# Patient Record
Sex: FEMALE | Race: WHITE | NOT HISPANIC OR LATINO | Employment: PART TIME | URBAN - NONMETROPOLITAN AREA
[De-identification: names, ages, dates, MRNs, and addresses within clinical notes are randomized per-mention and may not be internally consistent; named-entity substitution may affect disease eponyms.]

---

## 2017-05-22 ENCOUNTER — OFFICE VISIT (OUTPATIENT)
Dept: FAMILY MEDICINE CLINIC | Facility: CLINIC | Age: 37
End: 2017-05-22

## 2017-05-22 VITALS
BODY MASS INDEX: 33.82 KG/M2 | SYSTOLIC BLOOD PRESSURE: 120 MMHG | DIASTOLIC BLOOD PRESSURE: 60 MMHG | OXYGEN SATURATION: 98 % | HEART RATE: 50 BPM | HEIGHT: 66 IN | TEMPERATURE: 97.4 F | WEIGHT: 210.4 LBS

## 2017-05-22 DIAGNOSIS — G62.9 NEUROPATHY: Chronic | ICD-10-CM

## 2017-05-22 DIAGNOSIS — Z00.00 GENERAL MEDICAL EXAM: Primary | ICD-10-CM

## 2017-05-22 PROCEDURE — 99213 OFFICE O/P EST LOW 20 MIN: CPT | Performed by: INTERNAL MEDICINE

## 2017-05-22 RX ORDER — GABAPENTIN 600 MG/1
600 TABLET ORAL 3 TIMES DAILY
Qty: 90 TABLET | Refills: 5 | Status: SHIPPED | OUTPATIENT
Start: 2017-05-22 | End: 2017-11-21 | Stop reason: SDUPTHER

## 2017-11-21 DIAGNOSIS — Z79.899 LONG TERM USE OF DRUG: Primary | ICD-10-CM

## 2017-11-21 RX ORDER — GABAPENTIN 600 MG/1
600 TABLET ORAL 3 TIMES DAILY
Qty: 42 TABLET | Refills: 0 | Status: SHIPPED | OUTPATIENT
Start: 2017-11-21 | End: 2017-11-28 | Stop reason: SDUPTHER

## 2017-11-28 ENCOUNTER — LAB (OUTPATIENT)
Dept: LAB | Facility: OTHER | Age: 37
End: 2017-11-28

## 2017-11-28 DIAGNOSIS — Z00.00 GENERAL MEDICAL EXAM: ICD-10-CM

## 2017-11-28 DIAGNOSIS — Z79.899 LONG TERM USE OF DRUG: ICD-10-CM

## 2017-11-28 LAB
ALBUMIN SERPL-MCNC: 3.8 G/DL (ref 3.2–5.5)
ALBUMIN/GLOB SERPL: 1.2 G/DL (ref 1–3)
ALP SERPL-CCNC: 62 U/L (ref 15–121)
ALT SERPL W P-5'-P-CCNC: 14 U/L (ref 10–60)
ANION GAP SERPL CALCULATED.3IONS-SCNC: 10 MMOL/L (ref 5–15)
AST SERPL-CCNC: 17 U/L (ref 10–60)
BASOPHILS # BLD AUTO: 0.03 10*3/MM3 (ref 0–0.2)
BASOPHILS NFR BLD AUTO: 0.3 % (ref 0–2)
BILIRUB SERPL-MCNC: 0.6 MG/DL (ref 0.2–1)
BUN BLD-MCNC: 11 MG/DL (ref 8–25)
BUN/CREAT SERPL: 13.8 (ref 7–25)
CALCIUM SPEC-SCNC: 9.3 MG/DL (ref 8.4–10.8)
CHLORIDE SERPL-SCNC: 104 MMOL/L (ref 100–112)
CHOLEST SERPL-MCNC: 153 MG/DL (ref 150–200)
CO2 SERPL-SCNC: 28 MMOL/L (ref 20–32)
CREAT BLD-MCNC: 0.8 MG/DL (ref 0.4–1.3)
DEPRECATED RDW RBC AUTO: 39 FL (ref 36.4–46.3)
EOSINOPHIL # BLD AUTO: 0.23 10*3/MM3 (ref 0–0.7)
EOSINOPHIL NFR BLD AUTO: 2.1 % (ref 0–7)
ERYTHROCYTE [DISTWIDTH] IN BLOOD BY AUTOMATED COUNT: 12.9 % (ref 11.5–14.5)
GFR SERPL CREATININE-BSD FRML MDRD: 81 ML/MIN/1.73 (ref 64–149)
GLOBULIN UR ELPH-MCNC: 3.1 GM/DL (ref 2.5–4.6)
GLUCOSE BLD-MCNC: 101 MG/DL (ref 70–100)
HCT VFR BLD AUTO: 42.6 % (ref 35–45)
HDLC SERPL-MCNC: 36 MG/DL (ref 35–100)
HGB BLD-MCNC: 14.1 G/DL (ref 12–15.5)
LDLC SERPL CALC-MCNC: 98 MG/DL
LDLC/HDLC SERPL: 2.73 {RATIO}
LYMPHOCYTES # BLD AUTO: 3.58 10*3/MM3 (ref 0.6–4.2)
LYMPHOCYTES NFR BLD AUTO: 32.3 % (ref 10–50)
MCH RBC QN AUTO: 28 PG (ref 26.5–34)
MCHC RBC AUTO-ENTMCNC: 33.1 G/DL (ref 31.4–36)
MCV RBC AUTO: 84.7 FL (ref 80–98)
MONOCYTES # BLD AUTO: 0.78 10*3/MM3 (ref 0–0.9)
MONOCYTES NFR BLD AUTO: 7 % (ref 0–12)
NEUTROPHILS # BLD AUTO: 6.46 10*3/MM3 (ref 2–8.6)
NEUTROPHILS NFR BLD AUTO: 58.3 % (ref 37–80)
PLATELET # BLD AUTO: 380 10*3/MM3 (ref 150–450)
PMV BLD AUTO: 8.8 FL (ref 8–12)
POTASSIUM BLD-SCNC: 3.9 MMOL/L (ref 3.4–5.4)
PROT SERPL-MCNC: 6.9 G/DL (ref 6.7–8.2)
RBC # BLD AUTO: 5.03 10*6/MM3 (ref 3.77–5.16)
SODIUM BLD-SCNC: 142 MMOL/L (ref 134–146)
T4 SERPL-MCNC: 11.1 MCG/DL (ref 5.5–11)
TRIGL SERPL-MCNC: 94 MG/DL (ref 35–160)
TSH SERPL DL<=0.05 MIU/L-ACNC: 1.88 MIU/ML (ref 0.46–4.68)
VLDLC SERPL-MCNC: 18.8 MG/DL
WBC NRBC COR # BLD: 11.08 10*3/MM3 (ref 3.2–9.8)

## 2017-11-28 PROCEDURE — 85025 COMPLETE CBC W/AUTO DIFF WBC: CPT | Performed by: INTERNAL MEDICINE

## 2017-11-28 PROCEDURE — 80061 LIPID PANEL: CPT | Performed by: INTERNAL MEDICINE

## 2017-11-28 PROCEDURE — 36415 COLL VENOUS BLD VENIPUNCTURE: CPT | Performed by: INTERNAL MEDICINE

## 2017-11-28 PROCEDURE — 80171 DRUG SCREEN QUANT GABAPENTIN: CPT | Performed by: INTERNAL MEDICINE

## 2017-11-28 PROCEDURE — 80053 COMPREHEN METABOLIC PANEL: CPT | Performed by: INTERNAL MEDICINE

## 2017-11-28 PROCEDURE — 84436 ASSAY OF TOTAL THYROXINE: CPT | Performed by: INTERNAL MEDICINE

## 2017-11-28 PROCEDURE — 84443 ASSAY THYROID STIM HORMONE: CPT | Performed by: INTERNAL MEDICINE

## 2017-11-30 ENCOUNTER — OFFICE VISIT (OUTPATIENT)
Dept: FAMILY MEDICINE CLINIC | Facility: CLINIC | Age: 37
End: 2017-11-30

## 2017-11-30 VITALS
SYSTOLIC BLOOD PRESSURE: 112 MMHG | BODY MASS INDEX: 31.23 KG/M2 | HEART RATE: 75 BPM | TEMPERATURE: 97.4 F | DIASTOLIC BLOOD PRESSURE: 74 MMHG | WEIGHT: 194.3 LBS | HEIGHT: 66 IN

## 2017-11-30 DIAGNOSIS — G62.9 NEUROPATHY: Primary | ICD-10-CM

## 2017-11-30 DIAGNOSIS — D22.30 NEVUS OF FACE: ICD-10-CM

## 2017-11-30 LAB — GABAPENTIN SERPLBLD-MCNC: NORMAL UG/ML (ref 4–16)

## 2017-11-30 PROCEDURE — 99213 OFFICE O/P EST LOW 20 MIN: CPT | Performed by: NURSE PRACTITIONER

## 2017-11-30 RX ORDER — GABAPENTIN 600 MG/1
600 TABLET ORAL 3 TIMES DAILY
Qty: 90 TABLET | Refills: 0 | Status: SHIPPED | OUTPATIENT
Start: 2017-11-30 | End: 2017-12-29 | Stop reason: SDUPTHER

## 2017-11-30 NOTE — PROGRESS NOTES
Subjective   Nilda Leny Jerez is a 37 y.o. female. Patient here today with complaints of Med Refill (pt reports out of medication for a week)  Patient here today for recheck of neuropathy, PCP Dr. Farias, needing refills on gabapentin.  She has been out for 1 week.  Reports that this does help her symptoms.  Also complains of an area on her right face which is been present for the last month not itching not bleeding and not changing colors.  Perhaps slightly larger then she noticed initially.    Vitals:    11/30/17 1534   BP: 112/74   Pulse: 75   Temp: 97.4 °F (36.3 °C)     Past Medical History:   Diagnosis Date   • Acute serous otitis media    • Acute sinusitis    • Asthma    • Chronic pain    • Degeneration of lumbar intervertebral disc    • Encounter for gynecological examination    • Encounter for long-term (current) use of medications     other   • Ganglion cyst     Right wrist      • H/O infectious mononucleosis    • Low back pain    • Lumbar radiculopathy     Left   • Menometrorrhagia    • Need for prophylactic vaccination or inoculation against diphtheria and tetanus     Need for prophylactic vaccination and inoculation against Diptheria-tetanus-pertussis, combined [DTP] [DTaP]   • Neuropathy    • Pain in lower limb    • Patient currently pregnant     G 1, P 1    • Routine history and physical examination of adult     Administrative   • Smoker    • Spinal stenosis of lumbar region    • Tenosynovitis     Tendinitis AND/OR tenosynovitis of the pelvic region      • Tibialis tendinitis    • Tobacco dependence syndrome    • Upper respiratory infection      History of Present Illness     The following portions of the patient's history were reviewed and updated as appropriate: allergies, current medications, past family history, past medical history, past social history, past surgical history and problem list.    Review of Systems   Constitutional: Negative.    HENT: Negative.    Skin: Positive for wound  (mole R face).       Objective   Physical Exam   Constitutional: She is oriented to person, place, and time. She appears well-developed and well-nourished. No distress.   HENT:   Head: Normocephalic and atraumatic.   Cardiovascular: Normal rate, regular rhythm and normal heart sounds.  Exam reveals no gallop and no friction rub.    No murmur heard.  Pulmonary/Chest: Effort normal and breath sounds normal. No respiratory distress. She has no wheezes. She has no rales.   Musculoskeletal: Normal range of motion.   Neurological: She is alert and oriented to person, place, and time.   Skin: Skin is warm and dry. Lesion (mole , R face, not itching, not bleeding, ) noted. No rash noted. She is not diaphoretic. No erythema. No pallor.        Psychiatric: She has a normal mood and affect. Her behavior is normal.   Nursing note and vitals reviewed.      Assessment/Plan   Nilda was seen today for med refill.    Diagnoses and all orders for this visit:    Neuropathy    Nevus of face    Other orders  -     gabapentin (NEURONTIN) 600 MG tablet; Take 1 tablet by mouth 3 (Three) Times a Day.    ny is obtained and reviewed, number is [id number]. Will give her refill ×1 month on gabapentin, she needs to follow-up with her PCP as scheduled for chronic conditions.  Advised she monitor mole on right face and if any changes or it starts bleeding or itching changing colors or size then she needs to be referred for removal to dermatology.  She is aware and is in agreement to this.  All questions and concerns are addressed with understanding noted. [place] query complete. Treatment plan to include limited course of prescribed controlled substance. Risks including addiction, benefits, and alternatives presented to patient.

## 2017-12-29 ENCOUNTER — TELEPHONE (OUTPATIENT)
Dept: FAMILY MEDICINE CLINIC | Facility: CLINIC | Age: 37
End: 2017-12-29

## 2017-12-29 RX ORDER — GABAPENTIN 600 MG/1
600 TABLET ORAL 3 TIMES DAILY
Qty: 15 TABLET | Refills: 0 | Status: SHIPPED | OUTPATIENT
Start: 2017-12-29 | End: 2018-01-02 | Stop reason: SDUPTHER

## 2017-12-29 RX ORDER — GABAPENTIN 600 MG/1
600 TABLET ORAL 3 TIMES DAILY
Qty: 15 TABLET | Refills: 0 | Status: CANCELLED | OUTPATIENT
Start: 2017-12-29

## 2017-12-29 NOTE — TELEPHONE ENCOUNTER
----- Message from Ramy White MD sent at 12/29/2017  2:51 PM CST -----   Please obtain an updated Chance.  Patient is requesting gabapentin refill.

## 2017-12-29 NOTE — TELEPHONE ENCOUNTER
Per Dr. White patient may have #15 tab(s) of the Gabapentin, will need to follow up with 's office next Tuesday.

## 2018-01-02 RX ORDER — GABAPENTIN 600 MG/1
600 TABLET ORAL 3 TIMES DAILY
Qty: 21 TABLET | Refills: 0 | Status: SHIPPED | OUTPATIENT
Start: 2018-01-02 | End: 2018-01-08 | Stop reason: SDUPTHER

## 2018-01-08 ENCOUNTER — OFFICE VISIT (OUTPATIENT)
Dept: FAMILY MEDICINE CLINIC | Facility: CLINIC | Age: 38
End: 2018-01-08

## 2018-01-08 VITALS
HEART RATE: 88 BPM | OXYGEN SATURATION: 98 % | SYSTOLIC BLOOD PRESSURE: 128 MMHG | BODY MASS INDEX: 31.88 KG/M2 | DIASTOLIC BLOOD PRESSURE: 72 MMHG | WEIGHT: 198.4 LBS | HEIGHT: 66 IN

## 2018-01-08 DIAGNOSIS — J01.40 ACUTE NON-RECURRENT PANSINUSITIS: Primary | ICD-10-CM

## 2018-01-08 PROCEDURE — 96372 THER/PROPH/DIAG INJ SC/IM: CPT | Performed by: INTERNAL MEDICINE

## 2018-01-08 PROCEDURE — 99213 OFFICE O/P EST LOW 20 MIN: CPT | Performed by: INTERNAL MEDICINE

## 2018-01-08 RX ORDER — TRIAMCINOLONE ACETONIDE 40 MG/ML
40 INJECTION, SUSPENSION INTRA-ARTICULAR; INTRAMUSCULAR ONCE
Status: COMPLETED | OUTPATIENT
Start: 2018-01-08 | End: 2018-01-08

## 2018-01-08 RX ORDER — LEVOFLOXACIN 500 MG/1
500 TABLET, FILM COATED ORAL DAILY
Qty: 10 TABLET | Refills: 0 | Status: SHIPPED | OUTPATIENT
Start: 2018-01-08 | End: 2018-04-23

## 2018-01-08 RX ORDER — GABAPENTIN 600 MG/1
600 TABLET ORAL 3 TIMES DAILY
Qty: 90 TABLET | Refills: 5 | Status: SHIPPED | OUTPATIENT
Start: 2018-01-08 | End: 2018-06-28 | Stop reason: SDUPTHER

## 2018-01-08 RX ADMIN — TRIAMCINOLONE ACETONIDE 40 MG: 40 INJECTION, SUSPENSION INTRA-ARTICULAR; INTRAMUSCULAR at 15:56

## 2018-01-08 NOTE — PROGRESS NOTES
Subjective   Nilda Jerez is a 37 y.o. female.   Chief Complaint   Patient presents with   • URI   • Med Refill     gabapentin     History of Present Illness patient complains of head congestion.  No fever or chills.  No cough.  This been on about 5 days.    The following portions of the patient's history were reviewed and updated as appropriate: allergies, current medications, past family history, past medical history, past social history, past surgical history and problem list.    Review of Systems   Constitutional: Negative for activity change, appetite change, fatigue and unexpected weight change.   HENT: Positive for congestion, sinus pain and sinus pressure. Negative for ear pain, facial swelling and hearing loss.    Eyes: Negative for discharge and visual disturbance.   Respiratory: Negative for cough, chest tightness, shortness of breath and wheezing.    Cardiovascular: Negative for chest pain, palpitations and leg swelling.   Gastrointestinal: Negative for abdominal pain.   Genitourinary: Negative for difficulty urinating, dysuria, flank pain, frequency, hematuria and urgency.   Musculoskeletal: Negative for joint swelling and myalgias.   Skin: Negative for color change and rash.   Allergic/Immunologic: Negative for food allergies.   Neurological: Negative for dizziness, syncope, weakness, numbness and headaches.   Hematological: Negative for adenopathy.   Psychiatric/Behavioral: Negative for confusion, decreased concentration, dysphoric mood, hallucinations, self-injury, sleep disturbance and suicidal ideas. The patient is not nervous/anxious.    All other systems reviewed and are negative.    In past two weeks the patient has not felt down, depressed, hopeless, or lost interest in doing things.   Objective   Physical Exam   Constitutional: She is oriented to person, place, and time. Vital signs are normal. She appears well-developed and well-nourished.   HENT:   Head: Normocephalic.   Nose:  Right sinus exhibits maxillary sinus tenderness and frontal sinus tenderness. Left sinus exhibits maxillary sinus tenderness and frontal sinus tenderness.   Neck: No thyromegaly present.   Cardiovascular: Normal rate, regular rhythm and normal heart sounds.    No murmur heard.  Pulmonary/Chest: Effort normal and breath sounds normal. She has no wheezes. She has no rales.   Musculoskeletal: She exhibits no edema.   Lymphadenopathy:     She has no cervical adenopathy.   Neurological: She is alert and oriented to person, place, and time. No cranial nerve deficit.   Skin: Skin is warm and dry.   Psychiatric: She has a normal mood and affect. Her speech is normal and behavior is normal. Judgment and thought content normal. Her mood appears not anxious. Thought content is not paranoid and not delusional. Cognition and memory are normal. She does not exhibit a depressed mood. She expresses no homicidal and no suicidal ideation. She expresses no suicidal plans and no homicidal plans.   Nursing note and vitals reviewed.      Assessment/Plan   Nilda was seen today for uri and med refill.    Diagnoses and all orders for this visit:    Acute non-recurrent pansinusitis    Other orders  -     gabapentin (NEURONTIN) 600 MG tablet; Take 1 tablet by mouth 3 (Three) Times a Day.    kenlog 40mg im. levaquin 500mg q day for 10 days.

## 2018-05-01 ENCOUNTER — OFFICE VISIT (OUTPATIENT)
Dept: FAMILY MEDICINE CLINIC | Facility: CLINIC | Age: 38
End: 2018-05-01

## 2018-05-01 VITALS
DIASTOLIC BLOOD PRESSURE: 78 MMHG | BODY MASS INDEX: 30.86 KG/M2 | HEIGHT: 66 IN | OXYGEN SATURATION: 98 % | HEART RATE: 92 BPM | SYSTOLIC BLOOD PRESSURE: 138 MMHG | TEMPERATURE: 97.2 F | WEIGHT: 192 LBS

## 2018-05-01 DIAGNOSIS — F17.200 TOBACCO DEPENDENCE SYNDROME: Chronic | ICD-10-CM

## 2018-05-01 DIAGNOSIS — N76.0 ACUTE VAGINITIS: ICD-10-CM

## 2018-05-01 DIAGNOSIS — E66.9 CLASS 1 OBESITY WITHOUT SERIOUS COMORBIDITY WITH BODY MASS INDEX (BMI) OF 31.0 TO 31.9 IN ADULT, UNSPECIFIED OBESITY TYPE: Chronic | ICD-10-CM

## 2018-05-01 DIAGNOSIS — G62.9 NEUROPATHY: Primary | Chronic | ICD-10-CM

## 2018-05-01 PROCEDURE — 99214 OFFICE O/P EST MOD 30 MIN: CPT | Performed by: NURSE PRACTITIONER

## 2018-05-01 RX ORDER — FLUCONAZOLE 200 MG/1
200 TABLET ORAL DAILY
Qty: 1 TABLET | Refills: 1 | Status: SHIPPED | OUTPATIENT
Start: 2018-05-01 | End: 2018-06-28
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
## 2018-06-28 ENCOUNTER — OFFICE VISIT (OUTPATIENT)
Dept: FAMILY MEDICINE CLINIC | Facility: CLINIC | Age: 38
End: 2018-06-28

## 2018-06-28 VITALS
BODY MASS INDEX: 30.15 KG/M2 | HEIGHT: 66 IN | WEIGHT: 187.6 LBS | HEART RATE: 101 BPM | DIASTOLIC BLOOD PRESSURE: 70 MMHG | SYSTOLIC BLOOD PRESSURE: 120 MMHG

## 2018-06-28 DIAGNOSIS — L60.0 INGROWN RIGHT BIG TOENAIL: ICD-10-CM

## 2018-06-28 DIAGNOSIS — D17.1 LIPOMA OF TORSO: ICD-10-CM

## 2018-06-28 DIAGNOSIS — F17.200 TOBACCO DEPENDENCE SYNDROME: Chronic | ICD-10-CM

## 2018-06-28 DIAGNOSIS — G62.9 NEUROPATHY: Primary | Chronic | ICD-10-CM

## 2018-06-28 PROCEDURE — 99214 OFFICE O/P EST MOD 30 MIN: CPT | Performed by: NURSE PRACTITIONER

## 2018-06-28 RX ORDER — GABAPENTIN 600 MG/1
600 TABLET ORAL 3 TIMES DAILY
Qty: 90 TABLET | Refills: 2 | Status: SHIPPED | OUTPATIENT
Start: 2018-06-28 | End: 2018-09-11 | Stop reason: SDUPTHER
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
## 2018-06-29 DIAGNOSIS — L60.0 INGROWN TOENAIL: Primary | ICD-10-CM

## 2018-06-29 DIAGNOSIS — D17.79 LIPOMA OF OTHER SPECIFIED SITES: ICD-10-CM

## 2018-08-02 ENCOUNTER — CONSULT (OUTPATIENT)
Dept: SURGERY | Facility: CLINIC | Age: 38
End: 2018-08-02

## 2018-08-02 VITALS
DIASTOLIC BLOOD PRESSURE: 74 MMHG | HEIGHT: 66 IN | WEIGHT: 190.2 LBS | BODY MASS INDEX: 30.57 KG/M2 | SYSTOLIC BLOOD PRESSURE: 118 MMHG

## 2018-08-02 DIAGNOSIS — R22.42 ANKLE MASS, LEFT: Primary | ICD-10-CM

## 2018-08-02 DIAGNOSIS — D17.1 LIPOMA OF TORSO: ICD-10-CM

## 2018-08-02 PROCEDURE — 99203 OFFICE O/P NEW LOW 30 MIN: CPT | Performed by: SURGERY

## 2018-08-15 NOTE — PROGRESS NOTES
CHIEF COMPLAINT:    Lipoma on right lower back and left ankle    HISTORY OF PRESENT ILLNESS:    Nilda Jerez is a 38 y.o. female who presents with complaints of soft tissue swelling on the right lower back as well as the left ankle.  Both sides of been present for months to years.  She doesn't really complain of any pain related to the lesion on her back.  She does state that she has some pain in her ankle and foot related to the soft tissue mass in this area.  She has had no prior intervention for these issues.    Past Medical History:   Diagnosis Date   • Acute serous otitis media    • Acute sinusitis    • Asthma    • Chronic pain    • Degeneration of lumbar intervertebral disc    • Encounter for gynecological examination    • Encounter for long-term (current) use of medications     other   • Ganglion cyst     Right wrist      • H/O infectious mononucleosis    • Low back pain    • Lumbar radiculopathy     Left   • Menometrorrhagia    • Need for prophylactic vaccination or inoculation against diphtheria and tetanus     Need for prophylactic vaccination and inoculation against Diptheria-tetanus-pertussis, combined [DTP] [DTaP]   • Neuropathy    • Pain in lower limb    • Patient currently pregnant     G 1, P 1    • Routine history and physical examination of adult     Administrative   • Smoker    • Spinal stenosis of lumbar region    • Tenosynovitis     Tendinitis AND/OR tenosynovitis of the pelvic region      • Tibialis tendinitis    • Tobacco dependence syndrome    • Upper respiratory infection        Past Surgical History:   Procedure Laterality Date   • ANAL FISSURECTOMY      Removal of anal fissure (1)      • HYSTEROSCOPY  03/07/2012    Hysteroscope procedure (1)      • INJECTION OF MEDICATION  11/21/2013    Kenalog (2)      • PAP SMEAR  10/02/2014   • VAGINAL DELIVERY  2003       Prior to Admission medications    Medication Sig Start Date End Date Taking? Authorizing Provider   albuterol (PROVENTIL  HFA;VENTOLIN HFA) 108 (90 BASE) MCG/ACT inhaler Inhale 2 puffs Every 4 (Four) Hours As Needed for wheezing.   Yes Provider, MD Irish   gabapentin (NEURONTIN) 600 MG tablet Take 1 tablet by mouth 3 (Three) Times a Day. 6/28/18  Yes Nellie Maurice APRN       No Known Allergies    Family History   Problem Relation Age of Onset   • Breast cancer Mother    • Diabetes Mother    • Hypertension Mother    • Diabetes Father    • Hypertension Father    • Crohn's disease Father    • Crohn's disease Sister        Social History     Social History   • Marital status:      Spouse name: N/A   • Number of children: N/A   • Years of education: N/A     Occupational History   • Not on file.     Social History Main Topics   • Smoking status: Current Every Day Smoker     Packs/day: 1.00   • Smokeless tobacco: Never Used   • Alcohol use Yes      Comment: Occasional   • Drug use: No   • Sexual activity: Yes     Partners: Male     Other Topics Concern   • Not on file     Social History Narrative   • No narrative on file       Review of Systems   Constitutional: Negative for appetite change, chills, fever and unexpected weight change.   HENT: Negative for hearing loss, nosebleeds and trouble swallowing.    Eyes: Negative for visual disturbance.   Respiratory: Negative for apnea, cough, choking, chest tightness, shortness of breath, wheezing and stridor.    Cardiovascular: Negative for chest pain, palpitations and leg swelling.   Gastrointestinal: Negative for abdominal distention, abdominal pain, blood in stool, constipation, diarrhea, nausea and vomiting.   Endocrine: Negative for cold intolerance, heat intolerance, polydipsia, polyphagia and polyuria.   Genitourinary: Negative for difficulty urinating, dysuria, frequency, hematuria and urgency.   Musculoskeletal: Negative for arthralgias, back pain, myalgias and neck pain.   Skin: Negative for color change, pallor and rash.   Allergic/Immunologic: Negative for  "immunocompromised state.   Neurological: Negative for dizziness, seizures, syncope, light-headedness, numbness and headaches.   Hematological: Negative for adenopathy.   Psychiatric/Behavioral: Negative for suicidal ideas. The patient is not nervous/anxious.        Objective     /74   Ht 167.6 cm (66\")   Wt 86.3 kg (190 lb 3.2 oz)   BMI 30.70 kg/m²     Physical Exam   Constitutional: She is oriented to person, place, and time. She appears well-developed and well-nourished. No distress.   HENT:   Head: Normocephalic and atraumatic.   Eyes: Pupils are equal, round, and reactive to light. Conjunctivae and EOM are normal. Right eye exhibits no discharge. Left eye exhibits no discharge.   Neck: Normal range of motion. Neck supple. No JVD present. No tracheal deviation present. No thyromegaly present.   Cardiovascular: Normal rate, regular rhythm and normal heart sounds.  Exam reveals no gallop and no friction rub.    No murmur heard.  Pulmonary/Chest: Effort normal and breath sounds normal. No respiratory distress. She has no wheezes. She has no rales. She exhibits no tenderness.   Abdominal: Soft. She exhibits no distension and no mass. There is no tenderness. There is no rebound and no guarding. No hernia.   Musculoskeletal: Normal range of motion. She exhibits no edema, tenderness or deformity.   Neurological: She is alert and oriented to person, place, and time. No cranial nerve deficit.   Skin: Skin is warm and dry. No rash noted. She is not diaphoretic. No erythema. No pallor.        Psychiatric: She has a normal mood and affect. Her behavior is normal. Judgment and thought content normal.         ASSESSMENT:    Lipoma of right lower back and left ankle    PLAN:    Currently she has minimal symptoms from her right lower back lipoma and she does not desire its removal.  She does report pain and limited mobility of the left ankle which she relates to the lipoma in this area.  I have referred her to podiatry " for evaluation of this.  She can see me as needed.          This document has been electronically signed by Jose Guadalupe Trevino MD on August 15, 2018 3:17 PM

## 2018-08-28 ENCOUNTER — OFFICE VISIT (OUTPATIENT)
Dept: PODIATRY | Facility: CLINIC | Age: 38
End: 2018-08-28

## 2018-08-28 VITALS
OXYGEN SATURATION: 97 % | HEIGHT: 66 IN | BODY MASS INDEX: 30.76 KG/M2 | SYSTOLIC BLOOD PRESSURE: 124 MMHG | WEIGHT: 191.4 LBS | HEART RATE: 98 BPM | DIASTOLIC BLOOD PRESSURE: 86 MMHG

## 2018-08-28 DIAGNOSIS — M79.89 SOFT TISSUE MASS: Primary | ICD-10-CM

## 2018-08-28 DIAGNOSIS — M25.572 LEFT ANKLE PAIN, UNSPECIFIED CHRONICITY: ICD-10-CM

## 2018-08-28 PROCEDURE — 99203 OFFICE O/P NEW LOW 30 MIN: CPT | Performed by: PODIATRIST

## 2018-08-28 NOTE — PROGRESS NOTES
Nilda Jerez  1980  38 y.o. female    08/28/2018  Chief Complaint   Patient presents with   • Left Ankle - Pain           History of Present Illness    Nilda Jerez is a 38 y.o. female who presents for evaluation of a left ankle painful mass.  She was referred by Dr. Trevino who is currently working her up for a back lesion as well.  She states the mass is been present for a couple of years.  She states causes achy pain while ambulating.  She does have a remote history of a bad ankle sprain on the left side but states she was able to ambulate without issue for several years before appearance of the small mass.  She denies any overlying skin changes.  The pain is worse with prolonged activity and to the touch.  She denies any prior treatments for this issue.      Past Medical History:   Diagnosis Date   • Acute serous otitis media    • Acute sinusitis    • Asthma    • Chronic pain    • Degeneration of lumbar intervertebral disc    • Encounter for gynecological examination    • Encounter for long-term (current) use of medications     other   • Ganglion cyst     Right wrist      • H/O infectious mononucleosis    • Ingrown toenail    • Low back pain    • Lumbar radiculopathy     Left   • Menometrorrhagia    • Need for prophylactic vaccination or inoculation against diphtheria and tetanus     Need for prophylactic vaccination and inoculation against Diptheria-tetanus-pertussis, combined [DTP] [DTaP]   • Neuropathy    • Pain in lower limb    • Patient currently pregnant     G 1, P 1    • Routine history and physical examination of adult     Administrative   • Smoker    • Spinal stenosis of lumbar region    • Tenosynovitis     Tendinitis AND/OR tenosynovitis of the pelvic region      • Tibialis tendinitis    • Tobacco dependence syndrome    • Upper respiratory infection          Past Surgical History:   Procedure Laterality Date   • ANAL FISSURECTOMY      Removal of anal fissure (1)      •  "HYSTEROSCOPY  03/07/2012    Hysteroscope procedure (1)      • INJECTION OF MEDICATION  11/21/2013    Kenalog (2)      • PAP SMEAR  10/02/2014   • VAGINAL DELIVERY  2003         Family History   Problem Relation Age of Onset   • Breast cancer Mother    • Diabetes Mother    • Hypertension Mother    • Diabetes Father    • Hypertension Father    • Crohn's disease Father    • Crohn's disease Sister          Social History     Social History   • Marital status:      Spouse name: N/A   • Number of children: N/A   • Years of education: N/A     Occupational History   • Not on file.     Social History Main Topics   • Smoking status: Current Every Day Smoker     Packs/day: 1.00   • Smokeless tobacco: Never Used   • Alcohol use Yes      Comment: Occasional   • Drug use: No   • Sexual activity: Yes     Partners: Male     Other Topics Concern   • Not on file     Social History Narrative   • No narrative on file         Current Outpatient Prescriptions   Medication Sig Dispense Refill   • gabapentin (NEURONTIN) 600 MG tablet Take 1 tablet by mouth 3 (Three) Times a Day. 90 tablet 2   • albuterol (PROVENTIL HFA;VENTOLIN HFA) 108 (90 BASE) MCG/ACT inhaler Inhale 2 puffs Every 4 (Four) Hours As Needed for wheezing.       No current facility-administered medications for this visit.          OBJECTIVE    /86   Pulse 98   Ht 167.6 cm (66\")   Wt 86.8 kg (191 lb 6.4 oz)   SpO2 97%   BMI 30.89 kg/m²       Review of Systems   Constitutional: Negative.    HENT: Negative.    Eyes: Negative.    Respiratory: Negative.    Cardiovascular: Negative.    Gastrointestinal: Negative.    Endocrine: Negative.    Genitourinary: Negative.    Musculoskeletal: Positive for back pain.        Joint pain   Skin: Negative.    Allergic/Immunologic: Negative.    Neurological: Negative.    Hematological: Negative.    Psychiatric/Behavioral: The patient is nervous/anxious.          Physical Exam   Constitutional: he appears well-developed and " well-nourished.   HEENT: Normocephalic. Atraumatic.  CV: No CP. RRR  Resp: Non-labored respirations.  Psychiatric: he has a normal mood and affect. his behavior is normal.         Lower Extremity Exam:  Vascular: DP/PT pulses palpable 2+.   Mild left ankle edema  Foot warm  Neuro: Protective sensation intact, b/l.  Light touch sensation intact, b/l  DTRs intact  Integument: No open wounds or lesions.  No erythema, scaling  Small approximately 2-2.5 cm diameter fatty mass over the sinus tarsi and lateral ankle, left  Musculoskeletal:  B/LE muscle strength 5/5.   Achilles, TA, TP, Peroneal tendons intact  No gross instability  Some tenderness palpation of left sinus tarsi, lateral ankle gutter  Gait normal  Left lateral malleolus slightly more bulbous in appearance compared to the contralateral limb.  No tenderness palpation.        ASSESSMENT AND PLAN    Nilda was seen today for pain.    Diagnoses and all orders for this visit:    Soft tissue mass  -     MRI Ankle Left Without Contrast; Future    Left ankle pain, unspecified chronicity  -     XR Ankle 3+ View Left  -     MRI Ankle Left Without Contrast; Future      -Comprehensive foot and ankle exam performed  -Radiographs ordered and reviewed  -Educated pt on diagnosis, etiology and treatment of soft tissue mass  -Left ankle radiographs did show mild osteopenia of the lateral malleolus.  Will obtain MRI to rule out any osseous involvement and identify the extent of the soft tissue mass for surgical planning.  -Recheck following MRI            This document has been electronically signed by Ismael Cuevas DPM on August 29, 2018 5:36 PM     EMR Dragon/Transcription disclaimer:   Much of this encounter note is an electronic transcription/translation of spoken language to printed text. The electronic translation of spoken language may permit erroneous, or at times, nonsensical words or phrases to be inadvertently transcribed; Although I have reviewed the note for  such errors, some may still exist.    Ismael Cuevas DPM  8/29/2018  5:36 PM

## 2018-09-04 ENCOUNTER — HOSPITAL ENCOUNTER (OUTPATIENT)
Dept: MRI IMAGING | Facility: HOSPITAL | Age: 38
Discharge: HOME | End: 2018-09-04
Attending: PODIATRIST | Admitting: PODIATRIST

## 2018-09-04 DIAGNOSIS — M25.572 LEFT ANKLE PAIN, UNSPECIFIED CHRONICITY: ICD-10-CM

## 2018-09-04 DIAGNOSIS — M79.89 SOFT TISSUE MASS: ICD-10-CM

## 2018-09-04 PROCEDURE — 73721 MRI JNT OF LWR EXTRE W/O DYE: CPT

## 2018-09-07 ENCOUNTER — OFFICE VISIT (OUTPATIENT)
Dept: PODIATRY | Facility: CLINIC | Age: 38
End: 2018-09-07

## 2018-09-07 VITALS — BODY MASS INDEX: 31.92 KG/M2 | HEIGHT: 66 IN | WEIGHT: 198.6 LBS

## 2018-09-07 DIAGNOSIS — M71.39 GANGLION AND CYST OF SYNOVIUM, TENDON AND BURSA: ICD-10-CM

## 2018-09-07 DIAGNOSIS — M67.89 GANGLION AND CYST OF SYNOVIUM, TENDON AND BURSA: ICD-10-CM

## 2018-09-07 DIAGNOSIS — M79.89 SOFT TISSUE MASS: Primary | ICD-10-CM

## 2018-09-07 DIAGNOSIS — M67.49 GANGLION AND CYST OF SYNOVIUM, TENDON AND BURSA: ICD-10-CM

## 2018-09-07 PROCEDURE — 99214 OFFICE O/P EST MOD 30 MIN: CPT | Performed by: PODIATRIST

## 2018-09-07 RX ORDER — HYDROCODONE BITARTRATE AND ACETAMINOPHEN 5; 325 MG/1; MG/1
1 TABLET ORAL EVERY 6 HOURS PRN
Qty: 30 TABLET | Refills: 0 | Status: SHIPPED | OUTPATIENT
Start: 2018-09-07 | End: 2018-09-24 | Stop reason: SDUPTHER

## 2018-09-07 NOTE — PROGRESS NOTES
Nilda Jerez  1980  38 y.o. female  09/07/2018    Chief Complaint   Patient presents with   • Left Ankle - Pain           History of Present Illness    Nilda Jerez is a 38 y.o. female who presents for follow-up of left ankle mass.  Has had an MRI since last visit.  Continues to complain of persistent pain to the area.    Past Medical History:   Diagnosis Date   • Acute serous otitis media    • Acute sinusitis    • Asthma    • Chronic pain    • Degeneration of lumbar intervertebral disc    • Encounter for gynecological examination    • Encounter for long-term (current) use of medications     other   • Ganglion cyst     Right wrist      • H/O infectious mononucleosis    • Ingrown toenail    • Low back pain    • Lumbar radiculopathy     Left   • Menometrorrhagia    • Need for prophylactic vaccination or inoculation against diphtheria and tetanus     Need for prophylactic vaccination and inoculation against Diptheria-tetanus-pertussis, combined [DTP] [DTaP]   • Neuropathy    • Pain in lower limb    • Patient currently pregnant     G 1, P 1    • Routine history and physical examination of adult     Administrative   • Smoker    • Spinal stenosis of lumbar region    • Tenosynovitis     Tendinitis AND/OR tenosynovitis of the pelvic region      • Tibialis tendinitis    • Tobacco dependence syndrome    • Upper respiratory infection          Past Surgical History:   Procedure Laterality Date   • ANAL FISSURECTOMY      Removal of anal fissure (1)      • HYSTEROSCOPY  03/07/2012    Hysteroscope procedure (1)      • INJECTION OF MEDICATION  11/21/2013    Kenalog (2)      • PAP SMEAR  10/02/2014   • VAGINAL DELIVERY  2003         Family History   Problem Relation Age of Onset   • Breast cancer Mother    • Diabetes Mother    • Hypertension Mother    • Diabetes Father    • Hypertension Father    • Crohn's disease Father    • Crohn's disease Sister          Social History     Social History   • Marital  "status:      Spouse name: N/A   • Number of children: N/A   • Years of education: N/A     Occupational History   • Not on file.     Social History Main Topics   • Smoking status: Current Every Day Smoker     Packs/day: 1.00   • Smokeless tobacco: Never Used   • Alcohol use Yes      Comment: Occasional   • Drug use: No   • Sexual activity: Yes     Partners: Male     Other Topics Concern   • Not on file     Social History Narrative   • No narrative on file         Current Outpatient Prescriptions   Medication Sig Dispense Refill   • albuterol (PROVENTIL HFA;VENTOLIN HFA) 108 (90 BASE) MCG/ACT inhaler Inhale 2 puffs Every 4 (Four) Hours As Needed for wheezing.     • gabapentin (NEURONTIN) 600 MG tablet Take 1 tablet by mouth 3 (Three) Times a Day. 90 tablet 2   • HYDROcodone-acetaminophen (NORCO) 5-325 MG per tablet Take 1 tablet by mouth Every 6 (Six) Hours As Needed for Moderate Pain . 30 tablet 0     No current facility-administered medications for this visit.          OBJECTIVE    Ht 167.6 cm (65.98\")   Wt 90.1 kg (198 lb 9.6 oz)   BMI 32.07 kg/m²       Review of Systems   Constitutional: Negative.    HENT: Negative.    Eyes: Negative.    Respiratory: Negative.    Cardiovascular: Negative.    Gastrointestinal: Negative.    Endocrine: Negative.    Genitourinary: Negative.    Musculoskeletal: Positive for back pain.        Joint pain   Skin: Negative.    Allergic/Immunologic: Negative.    Neurological: Negative.    Hematological: Negative.    Psychiatric/Behavioral: The patient is nervous/anxious.          Physical Exam   Constitutional: he appears well-developed and well-nourished.   HEENT: Normocephalic. Atraumatic.  CV: No CP. RRR  Resp: Non-labored respirations.  Psychiatric: he has a normal mood and affect. his behavior is normal.         Lower Extremity Exam:  Vascular: DP/PT pulses palpable 2+.   Mild left ankle edema  Foot warm  Neuro: Protective sensation intact, b/l.  Light touch sensation intact, " b/l  DTRs intact  Integument: No open wounds or lesions.  No erythema, scaling  Small approximately 2-2.5 cm diameter soft, mobile mass over the sinus tarsi and lateral ankle, left  Musculoskeletal:  B/LE muscle strength 5/5.   Achilles, TA, TP, Peroneal tendons intact  No gross instability  Some tenderness palpation of left sinus tarsi, lateral ankle gutter  Gait normal  Left lateral malleolus slightly more bulbous in appearance compared to the contralateral limb.  No tenderness palpation.        ASSESSMENT AND PLAN    Nilda was seen today for pain.    Diagnoses and all orders for this visit:    Soft tissue mass  -     Case Request    Ganglion and cyst of synovium, tendon and bursa    Other orders  -     HYDROcodone-acetaminophen (NORCO) 5-325 MG per tablet; Take 1 tablet by mouth Every 6 (Six) Hours As Needed for Moderate Pain .      -Comprehensive foot and ankle exam performed  -MRI reviewed with patient  -Educated pt on diagnosis, etiology and treatment of suspected ganglion cyst.  No osseous pathology noted on MRI  -Patient interested in resection of soft tissue mass.  Discussed all risks benefits and potential palpitations including but not limited to delayed healing, infection, recurrence, persistent pain, need for additional surgery, DVT/PE as well as the need for immobilization of the ankle.  -Will plan for 9/19/2018.  Patient also interested in having a flank mass removed by Dr. Trevino at the same time.  We'll discuss this with his office.  -Recheck 2 weeks            This document has been electronically signed by Ismael Cuevas DPM on September 8, 2018 11:52 AM     EMR Dragon/Transcription disclaimer:   Much of this encounter note is an electronic transcription/translation of spoken language to printed text. The electronic translation of spoken language may permit erroneous, or at times, nonsensical words or phrases to be inadvertently transcribed; Although I have reviewed the note for such  errors, some may still exist.    Ismael Cuevas DPM  9/8/2018  11:52 AM

## 2018-09-11 ENCOUNTER — LAB (OUTPATIENT)
Dept: LAB | Facility: OTHER | Age: 38
End: 2018-09-11

## 2018-09-11 ENCOUNTER — OFFICE VISIT (OUTPATIENT)
Dept: FAMILY MEDICINE CLINIC | Facility: CLINIC | Age: 38
End: 2018-09-11

## 2018-09-11 VITALS
TEMPERATURE: 97.7 F | BODY MASS INDEX: 31.76 KG/M2 | HEART RATE: 75 BPM | HEIGHT: 66 IN | WEIGHT: 197.6 LBS | DIASTOLIC BLOOD PRESSURE: 76 MMHG | SYSTOLIC BLOOD PRESSURE: 118 MMHG

## 2018-09-11 DIAGNOSIS — F17.200 TOBACCO DEPENDENCE SYNDROME: Chronic | ICD-10-CM

## 2018-09-11 DIAGNOSIS — G62.9 NEUROPATHY: Chronic | ICD-10-CM

## 2018-09-11 DIAGNOSIS — M54.42 MIDLINE LOW BACK PAIN WITH BILATERAL SCIATICA, UNSPECIFIED CHRONICITY: ICD-10-CM

## 2018-09-11 DIAGNOSIS — R29.898 WEAKNESS OF BOTH LOWER EXTREMITIES: ICD-10-CM

## 2018-09-11 DIAGNOSIS — M54.41 MIDLINE LOW BACK PAIN WITH BILATERAL SCIATICA, UNSPECIFIED CHRONICITY: ICD-10-CM

## 2018-09-11 DIAGNOSIS — M54.42 MIDLINE LOW BACK PAIN WITH BILATERAL SCIATICA, UNSPECIFIED CHRONICITY: Primary | ICD-10-CM

## 2018-09-11 DIAGNOSIS — M54.41 MIDLINE LOW BACK PAIN WITH BILATERAL SCIATICA, UNSPECIFIED CHRONICITY: Primary | ICD-10-CM

## 2018-09-11 LAB
BACTERIA UR QL AUTO: ABNORMAL /HPF
BILIRUB UR QL STRIP: NEGATIVE
CLARITY UR: CLEAR
COLOR UR: YELLOW
GLUCOSE UR STRIP-MCNC: NEGATIVE MG/DL
HGB UR QL STRIP.AUTO: ABNORMAL
HYALINE CASTS UR QL AUTO: ABNORMAL /LPF
KETONES UR QL STRIP: NEGATIVE
LEUKOCYTE ESTERASE UR QL STRIP.AUTO: NEGATIVE
NITRITE UR QL STRIP: NEGATIVE
PH UR STRIP.AUTO: 7 [PH] (ref 5.5–8)
PROT UR QL STRIP: NEGATIVE
RBC # UR: ABNORMAL /HPF
REF LAB TEST METHOD: ABNORMAL
SP GR UR STRIP: 1.02 (ref 1–1.03)
SQUAMOUS #/AREA URNS HPF: ABNORMAL /HPF
UROBILINOGEN UR QL STRIP: ABNORMAL
WBC UR QL AUTO: ABNORMAL /HPF

## 2018-09-11 PROCEDURE — 81001 URINALYSIS AUTO W/SCOPE: CPT | Performed by: NURSE PRACTITIONER

## 2018-09-11 PROCEDURE — 80171 DRUG SCREEN QUANT GABAPENTIN: CPT | Performed by: NURSE PRACTITIONER

## 2018-09-11 PROCEDURE — 36415 COLL VENOUS BLD VENIPUNCTURE: CPT | Performed by: NURSE PRACTITIONER

## 2018-09-11 PROCEDURE — 99214 OFFICE O/P EST MOD 30 MIN: CPT | Performed by: NURSE PRACTITIONER

## 2018-09-11 RX ORDER — GABAPENTIN 600 MG/1
600 TABLET ORAL 3 TIMES DAILY
Qty: 90 TABLET | Refills: 2 | Status: CANCELLED | OUTPATIENT
Start: 2018-09-11

## 2018-09-12 PROBLEM — J45.909 ASTHMA: Status: ACTIVE | Noted: 2018-09-12

## 2018-09-12 PROBLEM — M54.16 LUMBAR RADICULOPATHY: Status: ACTIVE | Noted: 2018-09-12

## 2018-09-12 PROBLEM — M51.36 DEGENERATION OF LUMBAR INTERVERTEBRAL DISC: Status: ACTIVE | Noted: 2018-09-12

## 2018-09-12 RX ORDER — GABAPENTIN 600 MG/1
600 TABLET ORAL 3 TIMES DAILY
Qty: 90 TABLET | Refills: 2 | Status: SHIPPED | OUTPATIENT
Start: 2018-09-12 | End: 2018-10-16

## 2018-09-12 NOTE — PROGRESS NOTES
"Subjective   Nilda Leny Jerez is a 38 y.o. female. Patient here today with complaints of Med Refill  Patient here today for recheck of low back pain, neuropathy, reporting constant dull low back pain legs felt \"shaky\" and weak at times.  Worse if standing.  She continues to smoke.  On gabapentin and needing this refilled today.    Vitals:    09/11/18 1447   BP: 118/76   Pulse: 75   Temp: 97.7 °F (36.5 °C)     Past Medical History:   Diagnosis Date   • Acute serous otitis media    • Acute sinusitis    • Asthma    • Chronic pain    • Degeneration of lumbar intervertebral disc    • Encounter for gynecological examination    • Encounter for long-term (current) use of medications     other   • Ganglion cyst     Right wrist      • H/O infectious mononucleosis    • Ingrown toenail    • Low back pain    • Lumbar radiculopathy     Left   • Menometrorrhagia    • Need for prophylactic vaccination or inoculation against diphtheria and tetanus     Need for prophylactic vaccination and inoculation against Diptheria-tetanus-pertussis, combined [DTP] [DTaP]   • Neuropathy    • Pain in lower limb    • Patient currently pregnant     G 1, P 1    • Routine history and physical examination of adult     Administrative   • Smoker    • Spinal stenosis of lumbar region    • Tenosynovitis     Tendinitis AND/OR tenosynovitis of the pelvic region      • Tibialis tendinitis    • Tobacco dependence syndrome    • Upper respiratory infection      Back Pain   This is a chronic problem. The current episode started more than 1 year ago. The problem occurs constantly. The problem has been waxing and waning since onset. The pain is present in the lumbar spine. The quality of the pain is described as aching. The pain radiates to the left foot, left thigh, left knee, right foot, right knee and right thigh. The pain is moderate. The symptoms are aggravated by standing. Associated symptoms include numbness, tingling and weakness. Pertinent negatives " include no abdominal pain, bladder incontinence, bowel incontinence, chest pain, dysuria, fever, headaches, pelvic pain or weight loss. Risk factors include obesity. She has tried analgesics, bed rest and walking for the symptoms. The treatment provided mild relief.        The following portions of the patient's history were reviewed and updated as appropriate: allergies, current medications, past family history, past medical history, past social history, past surgical history and problem list.    Review of Systems   Constitutional: Negative for fever and weight loss.   HENT: Negative.    Eyes: Negative.    Respiratory: Negative.    Cardiovascular: Negative.  Negative for chest pain.   Gastrointestinal: Negative.  Negative for abdominal pain and bowel incontinence.   Endocrine: Negative.    Genitourinary: Negative.  Negative for bladder incontinence, dysuria and pelvic pain.   Musculoskeletal: Positive for back pain and gait problem.   Skin: Negative.    Allergic/Immunologic: Negative.    Neurological: Positive for tingling, weakness and numbness. Negative for headaches.   Hematological: Negative.    Psychiatric/Behavioral: Negative.        Objective   Physical Exam   Constitutional: She is oriented to person, place, and time. She appears well-developed and well-nourished. No distress.   HENT:   Head: Normocephalic and atraumatic.   Cardiovascular: Normal rate, regular rhythm and normal heart sounds.  Exam reveals no gallop and no friction rub.    No murmur heard.  Pulmonary/Chest: Effort normal. No respiratory distress. She has decreased breath sounds in the right upper field, the right middle field, the right lower field, the left upper field, the left middle field and the left lower field. She has no wheezes. She has no rales.   Musculoskeletal: She exhibits tenderness.        Lumbar back: She exhibits decreased range of motion, tenderness, bony tenderness and pain. She exhibits no swelling, no edema, no  deformity, no laceration, no spasm and normal pulse.        Back:    Ambulatory without assistance   Neurological: She is alert and oriented to person, place, and time. She displays normal reflexes. No cranial nerve deficit or sensory deficit. She exhibits normal muscle tone. Coordination normal.   Skin: Skin is warm and dry. She is not diaphoretic.   Psychiatric: She has a normal mood and affect. Her behavior is normal.   Nursing note and vitals reviewed.      Assessment/Plan   Nilda was seen today for med refill.    Diagnoses and all orders for this visit:    Midline low back pain with bilateral sciatica, unspecified chronicity  -     XR Spine Lumbar 2 or 3 View  -     Gabapentin level; Future  -     Urinalysis With Culture If Indicated - Urine, Clean Catch; Future    Weakness of both lower extremities    Tobacco dependence syndrome    Neuropathy    Other orders  -     Cancel: gabapentin (NEURONTIN) 600 MG tablet; Take 1 tablet by mouth 3 (Three) Times a Day.  -     gabapentin (NEURONTIN) 600 MG tablet; Take 1 tablet by mouth 3 (Three) Times a Day.    Advised once again to stop smoking   Ny is obtained and reviewed   Refill on Neurontin given as above   I will obtain gabapentin level, x-ray of L-spine and urinalysis in addition to previous labs that have already been ordered   She will follow-up in 3 months or sooner should legs continue to feel weak, shaky.    Patient aware and is in agreement to this plan.    May need to pursue physical therapy/MRI if symptoms persist and patient aware.  However we will start with x-ray results first   All questions and concerns are addressed with understanding noted.   [place] query complete. Treatment plan to include limited course of prescribed controlled substance. Risks including addiction, benefits, and alternatives presented to patient.

## 2018-09-13 ENCOUNTER — TELEPHONE (OUTPATIENT)
Dept: FAMILY MEDICINE CLINIC | Facility: CLINIC | Age: 38
End: 2018-09-13

## 2018-09-13 DIAGNOSIS — M54.9 BACK PAIN, UNSPECIFIED BACK LOCATION, UNSPECIFIED BACK PAIN LATERALITY, UNSPECIFIED CHRONICITY: Primary | ICD-10-CM

## 2018-09-13 NOTE — TELEPHONE ENCOUNTER
----- Message from ABBI Guerrero sent at 9/13/2018  8:50 AM CDT -----  Inform pt, obtain MRI l/s and follow up after for results

## 2018-09-13 NOTE — TELEPHONE ENCOUNTER
Informed pt of results, she stated her understanding, ordered MRI- wants done at [facility], [place].

## 2018-09-14 ENCOUNTER — APPOINTMENT (OUTPATIENT)
Dept: PREADMISSION TESTING | Facility: HOSPITAL | Age: 38
End: 2018-09-14

## 2018-09-14 VITALS
RESPIRATION RATE: 14 BRPM | DIASTOLIC BLOOD PRESSURE: 70 MMHG | OXYGEN SATURATION: 98 % | SYSTOLIC BLOOD PRESSURE: 100 MMHG | BODY MASS INDEX: 30.53 KG/M2 | HEART RATE: 84 BPM | WEIGHT: 190 LBS | HEIGHT: 66 IN

## 2018-09-14 LAB — GABAPENTIN SERPLBLD-MCNC: NORMAL UG/ML (ref 4–16)

## 2018-09-17 ENCOUNTER — PREP FOR SURGERY (OUTPATIENT)
Dept: OTHER | Facility: HOSPITAL | Age: 38
End: 2018-09-17

## 2018-09-17 DIAGNOSIS — D17.1 LIPOMA OF TORSO: Primary | ICD-10-CM

## 2018-09-17 RX ORDER — SODIUM CHLORIDE 9 MG/ML
100 INJECTION, SOLUTION INTRAVENOUS CONTINUOUS
Status: CANCELLED | OUTPATIENT
Start: 2018-09-19

## 2018-09-17 RX ORDER — SODIUM CHLORIDE 0.9 % (FLUSH) 0.9 %
1-10 SYRINGE (ML) INJECTION AS NEEDED
Status: CANCELLED | OUTPATIENT
Start: 2018-09-19

## 2018-09-18 RX ORDER — SODIUM CHLORIDE, SODIUM GLUCONATE, SODIUM ACETATE, POTASSIUM CHLORIDE, AND MAGNESIUM CHLORIDE 526; 502; 368; 37; 30 MG/100ML; MG/100ML; MG/100ML; MG/100ML; MG/100ML
1000 INJECTION, SOLUTION INTRAVENOUS CONTINUOUS
Status: ACTIVE | OUTPATIENT
Start: 2018-09-18

## 2018-09-19 ENCOUNTER — ANESTHESIA (OUTPATIENT)
Dept: PERIOP | Facility: HOSPITAL | Age: 38
End: 2018-09-19

## 2018-09-19 ENCOUNTER — ANESTHESIA EVENT (OUTPATIENT)
Dept: PERIOP | Facility: HOSPITAL | Age: 38
End: 2018-09-19

## 2018-09-19 ENCOUNTER — HOSPITAL ENCOUNTER (OUTPATIENT)
Facility: HOSPITAL | Age: 38
Setting detail: HOSPITAL OUTPATIENT SURGERY
Discharge: HOME | End: 2018-09-19
Attending: PODIATRIST | Admitting: PODIATRIST

## 2018-09-19 ENCOUNTER — APPOINTMENT (OUTPATIENT)
Dept: MRI IMAGING | Facility: HOSPITAL | Age: 38
End: 2018-09-19

## 2018-09-19 VITALS
WEIGHT: 192.68 LBS | OXYGEN SATURATION: 99 % | SYSTOLIC BLOOD PRESSURE: 130 MMHG | TEMPERATURE: 96.9 F | BODY MASS INDEX: 30.97 KG/M2 | HEART RATE: 79 BPM | DIASTOLIC BLOOD PRESSURE: 85 MMHG | RESPIRATION RATE: 18 BRPM | HEIGHT: 66 IN

## 2018-09-19 DIAGNOSIS — D17.1 LIPOMA OF TORSO: ICD-10-CM

## 2018-09-19 DIAGNOSIS — M79.89 SOFT TISSUE MASS: ICD-10-CM

## 2018-09-19 LAB — B-HCG UR QL: NEGATIVE

## 2018-09-19 PROCEDURE — 81025 URINE PREGNANCY TEST: CPT | Performed by: ANESTHESIOLOGY

## 2018-09-19 PROCEDURE — 25010000002 DEXAMETHASONE PER 1 MG: Performed by: NURSE ANESTHETIST, CERTIFIED REGISTERED

## 2018-09-19 PROCEDURE — 25010000002 SUCCINYLCHOLINE PER 20 MG: Performed by: NURSE ANESTHETIST, CERTIFIED REGISTERED

## 2018-09-19 PROCEDURE — 25010000002 MIDAZOLAM PER 1 MG: Performed by: NURSE ANESTHETIST, CERTIFIED REGISTERED

## 2018-09-19 PROCEDURE — 88304 TISSUE EXAM BY PATHOLOGIST: CPT | Performed by: PODIATRIST

## 2018-09-19 PROCEDURE — 99218 PR INITIAL OBSERVATION CARE/DAY 30 MINUTES: CPT | Performed by: SURGERY

## 2018-09-19 PROCEDURE — 88304 TISSUE EXAM BY PATHOLOGIST: CPT | Performed by: PATHOLOGY

## 2018-09-19 PROCEDURE — 28041 EXC FOOT/TOE TUM DEP 1.5CM/>: CPT | Performed by: PODIATRIST

## 2018-09-19 PROCEDURE — 25010000002 FENTANYL CITRATE (PF) 100 MCG/2ML SOLUTION: Performed by: NURSE ANESTHETIST, CERTIFIED REGISTERED

## 2018-09-19 PROCEDURE — 25010000002 ONDANSETRON PER 1 MG: Performed by: NURSE ANESTHETIST, CERTIFIED REGISTERED

## 2018-09-19 RX ORDER — DIPHENHYDRAMINE HYDROCHLORIDE 50 MG/ML
12.5 INJECTION INTRAMUSCULAR; INTRAVENOUS
Status: DISCONTINUED | OUTPATIENT
Start: 2018-09-19 | End: 2018-09-19 | Stop reason: HOSPADM

## 2018-09-19 RX ORDER — FENTANYL CITRATE 50 UG/ML
INJECTION, SOLUTION INTRAMUSCULAR; INTRAVENOUS AS NEEDED
Status: DISCONTINUED | OUTPATIENT
Start: 2018-09-19 | End: 2018-09-19 | Stop reason: SURG

## 2018-09-19 RX ORDER — BUPIVACAINE HCL/0.9 % NACL/PF 0.1 %
2 PLASTIC BAG, INJECTION (ML) EPIDURAL ONCE
Status: COMPLETED | OUTPATIENT
Start: 2018-09-19 | End: 2018-09-19

## 2018-09-19 RX ORDER — EPHEDRINE SULFATE 50 MG/ML
5 INJECTION, SOLUTION INTRAVENOUS ONCE AS NEEDED
Status: DISCONTINUED | OUTPATIENT
Start: 2018-09-19 | End: 2018-09-19 | Stop reason: HOSPADM

## 2018-09-19 RX ORDER — MEPERIDINE HYDROCHLORIDE 50 MG/ML
12.5 INJECTION INTRAMUSCULAR; INTRAVENOUS; SUBCUTANEOUS
Status: DISCONTINUED | OUTPATIENT
Start: 2018-09-19 | End: 2018-09-19 | Stop reason: HOSPADM

## 2018-09-19 RX ORDER — LABETALOL HYDROCHLORIDE 5 MG/ML
5 INJECTION, SOLUTION INTRAVENOUS
Status: DISCONTINUED | OUTPATIENT
Start: 2018-09-19 | End: 2018-09-19 | Stop reason: HOSPADM

## 2018-09-19 RX ORDER — ACETAMINOPHEN 650 MG/1
650 SUPPOSITORY RECTAL ONCE AS NEEDED
Status: DISCONTINUED | OUTPATIENT
Start: 2018-09-19 | End: 2018-09-19 | Stop reason: HOSPADM

## 2018-09-19 RX ORDER — SODIUM CHLORIDE, SODIUM GLUCONATE, SODIUM ACETATE, POTASSIUM CHLORIDE, AND MAGNESIUM CHLORIDE 526; 502; 368; 37; 30 MG/100ML; MG/100ML; MG/100ML; MG/100ML; MG/100ML
INJECTION, SOLUTION INTRAVENOUS CONTINUOUS PRN
Status: DISCONTINUED | OUTPATIENT
Start: 2018-09-19 | End: 2018-09-19 | Stop reason: SURG

## 2018-09-19 RX ORDER — SUCCINYLCHOLINE CHLORIDE 20 MG/ML
INJECTION INTRAMUSCULAR; INTRAVENOUS AS NEEDED
Status: DISCONTINUED | OUTPATIENT
Start: 2018-09-19 | End: 2018-09-19 | Stop reason: SURG

## 2018-09-19 RX ORDER — ONDANSETRON 2 MG/ML
4 INJECTION INTRAMUSCULAR; INTRAVENOUS ONCE AS NEEDED
Status: DISCONTINUED | OUTPATIENT
Start: 2018-09-19 | End: 2018-09-19 | Stop reason: HOSPADM

## 2018-09-19 RX ORDER — SCOLOPAMINE TRANSDERMAL SYSTEM 1 MG/1
1 PATCH, EXTENDED RELEASE TRANSDERMAL
Status: DISCONTINUED | OUTPATIENT
Start: 2018-09-19 | End: 2018-09-19 | Stop reason: HOSPADM

## 2018-09-19 RX ORDER — FLUMAZENIL 0.1 MG/ML
0.2 INJECTION INTRAVENOUS AS NEEDED
Status: DISCONTINUED | OUTPATIENT
Start: 2018-09-19 | End: 2018-09-19 | Stop reason: HOSPADM

## 2018-09-19 RX ORDER — HYDROCODONE BITARTRATE AND ACETAMINOPHEN 5; 325 MG/1; MG/1
1-2 TABLET ORAL EVERY 4 HOURS PRN
Qty: 30 TABLET | Refills: 0 | Status: SHIPPED | OUTPATIENT
Start: 2018-09-19 | End: 2018-09-24

## 2018-09-19 RX ORDER — BUPIVACAINE HYDROCHLORIDE 5 MG/ML
INJECTION, SOLUTION EPIDURAL; INTRACAUDAL AS NEEDED
Status: DISCONTINUED | OUTPATIENT
Start: 2018-09-19 | End: 2018-09-19 | Stop reason: HOSPADM

## 2018-09-19 RX ORDER — SODIUM CHLORIDE 0.9 % (FLUSH) 0.9 %
1-10 SYRINGE (ML) INJECTION AS NEEDED
Status: DISCONTINUED | OUTPATIENT
Start: 2018-09-19 | End: 2018-09-19 | Stop reason: HOSPADM

## 2018-09-19 RX ORDER — DEXAMETHASONE SODIUM PHOSPHATE 4 MG/ML
INJECTION, SOLUTION INTRA-ARTICULAR; INTRALESIONAL; INTRAMUSCULAR; INTRAVENOUS; SOFT TISSUE AS NEEDED
Status: DISCONTINUED | OUTPATIENT
Start: 2018-09-19 | End: 2018-09-19 | Stop reason: SURG

## 2018-09-19 RX ORDER — SODIUM CHLORIDE 9 MG/ML
100 INJECTION, SOLUTION INTRAVENOUS CONTINUOUS
Status: DISCONTINUED | OUTPATIENT
Start: 2018-09-19 | End: 2018-09-19 | Stop reason: HOSPADM

## 2018-09-19 RX ORDER — NALOXONE HCL 0.4 MG/ML
0.2 VIAL (ML) INJECTION AS NEEDED
Status: DISCONTINUED | OUTPATIENT
Start: 2018-09-19 | End: 2018-09-19 | Stop reason: HOSPADM

## 2018-09-19 RX ORDER — MIDAZOLAM HYDROCHLORIDE 1 MG/ML
INJECTION INTRAMUSCULAR; INTRAVENOUS AS NEEDED
Status: DISCONTINUED | OUTPATIENT
Start: 2018-09-19 | End: 2018-09-19 | Stop reason: SURG

## 2018-09-19 RX ORDER — ONDANSETRON 2 MG/ML
INJECTION INTRAMUSCULAR; INTRAVENOUS AS NEEDED
Status: DISCONTINUED | OUTPATIENT
Start: 2018-09-19 | End: 2018-09-19 | Stop reason: SURG

## 2018-09-19 RX ORDER — LIDOCAINE HYDROCHLORIDE 20 MG/ML
INJECTION, SOLUTION INFILTRATION; PERINEURAL AS NEEDED
Status: DISCONTINUED | OUTPATIENT
Start: 2018-09-19 | End: 2018-09-19 | Stop reason: SURG

## 2018-09-19 RX ORDER — ACETAMINOPHEN 325 MG/1
650 TABLET ORAL ONCE AS NEEDED
Status: DISCONTINUED | OUTPATIENT
Start: 2018-09-19 | End: 2018-09-19 | Stop reason: HOSPADM

## 2018-09-19 RX ADMIN — ONDANSETRON 4 MG: 2 INJECTION INTRAMUSCULAR; INTRAVENOUS at 07:25

## 2018-09-19 RX ADMIN — SODIUM CHLORIDE, SODIUM GLUCONATE, SODIUM ACETATE, POTASSIUM CHLORIDE, AND MAGNESIUM CHLORIDE: 526; 502; 368; 37; 30 INJECTION, SOLUTION INTRAVENOUS at 07:38

## 2018-09-19 RX ADMIN — SCOPALAMINE 1 PATCH: 1 PATCH, EXTENDED RELEASE TRANSDERMAL at 06:12

## 2018-09-19 RX ADMIN — Medication 2 G: at 07:20

## 2018-09-19 RX ADMIN — LIDOCAINE HYDROCHLORIDE 50 MG: 20 INJECTION, SOLUTION INFILTRATION; PERINEURAL at 07:14

## 2018-09-19 RX ADMIN — SUCCINYLCHOLINE CHLORIDE 140 MG: 20 INJECTION, SOLUTION INTRAMUSCULAR; INTRAVENOUS at 07:14

## 2018-09-19 RX ADMIN — MIDAZOLAM HYDROCHLORIDE 2 MG: 2 INJECTION, SOLUTION INTRAMUSCULAR; INTRAVENOUS at 07:05

## 2018-09-19 RX ADMIN — DEXAMETHASONE SODIUM PHOSPHATE 4 MG: 4 INJECTION, SOLUTION INTRAMUSCULAR; INTRAVENOUS at 07:20

## 2018-09-19 RX ADMIN — FENTANYL CITRATE 100 MCG: 50 INJECTION, SOLUTION INTRAMUSCULAR; INTRAVENOUS at 07:14

## 2018-09-19 RX ADMIN — FENTANYL CITRATE 100 MCG: 50 INJECTION, SOLUTION INTRAMUSCULAR; INTRAVENOUS at 07:50

## 2018-09-19 RX ADMIN — SODIUM CHLORIDE 100 ML/HR: 9 INJECTION, SOLUTION INTRAVENOUS at 06:09

## 2018-09-19 RX ADMIN — FENTANYL CITRATE 50 MCG: 50 INJECTION, SOLUTION INTRAMUSCULAR; INTRAVENOUS at 08:28

## 2018-09-19 NOTE — DISCHARGE INSTRUCTIONS
Care after Local Anesthesia    You have remained awake during your surgery. The medicine you received was injected directly into the surgery site. The medicine numbed the area so you didn't feel any pain during surgery. Depending on the medicine used, you may feel comfortable for several hours.    Activity:    1) Use caution to protect your extremity from injury until the numbness is gone. You may return to your normal home activities as directed by your health care provider.    2) You are at an increased risk for falling related to the anesthesia and/or sedation during surgery and pain medication you will take at home. You will need assistance with ambulation until the feelings in your extremity returns to normal. Utilize assist devices (example: crutches, walker, cane or a care provider/family member as needed).    3) You must not drive a car or operate equipment for at least 24 hours unless exempted by the attending physician. If you are dizzy for longer than 24 hours, notify your physician.    Medicine/Discomfort:    You can expect some discomfort when the local anesthetic wears off, if your health care provider ordered pain medicine, take it as prescribed.    Diet:    You may resume your normal diet. Do not drink alcoholic beverages for at least 24 hours following anesthesia and/or sedation.    When to call your health care provider:          Call your health care provider if you have any questions or concerns.  Leave dressing clean, dry and intact until your first postoperative visit.    You may heel weight bear as tolerated in your surgical boot only.    Take pain medications as prescribed.     Elevate surgical extremity above level of heart while at rest. Apply ice back behind knee for 10 minutes of every hour while at rest.     Contact doctor for increased pain, drainage, nausea, vomiting, fever or chills.

## 2018-09-19 NOTE — OP NOTE
PREOPERATIVE DIAGNOSIS:  Left ankle soft tissue mass, suspected ganglion cyst.     POSTOPERATIVE DIAGNOSIS:  Left ankle soft tissue mass, suspected ganglion cyst.     PROCEDURES PERFORMED:  Excision of subfascial soft tissue mass of the left ankle.      SURGEON:  Ismael Cuevas DPM     ASSISTANT:  Ramona Machado MA     ANESTHESIA:  General.     HEMOSTASIS:  Left calf pneumatic tourniquet inflated to 250 mmHg for approximately 30 minutes.      ESTIMATED BLOOD LOSS:  Less than 10 mL.      MATERIALS:  None.     INJECTABLES:  15 mL of 0.5% Marcaine plain.       COMPLICATIONS:  None.      INDICATIONS:  This is a patient seen on an outpatient basis for a painful soft tissue mass to the lateral ankle.  Patient presents today for surgical excision.  All risks, benefits, and potential complications were described in detail with no guarantees given or implied at any time.  She has been NPO since midnight.  Informed consent had been obtained and located in the chart; 2 g of Kefzol was given as preoperative antibiotic in the preoperative holding area.       DESCRIPTION OF PROCEDURE:  Under mild sedation, the patient was brought into the operating room and placed on the operating room table in the supine position, following induction of general anesthesia and application of an ipsilateral hip bump. The left foot and ankle were prepped and draped in the usual aseptic fashion.  Attention at this time was then directed to the left lateral ankle in the area of the sinus tarsi where approximately a 4 cm curvilinear incision was created extending from the distal to the lateral malleolus towards the 4th metatarsal base.  Blunt dissection was carried down to the subcutaneous layer and the inferior flexor retinaculum was incised.  This then exposed a bulbous, multilocated, soft tissue mass which was fluid-filled, suspected a ganglion cyst.  The cyst was carefully  from the adjacent tissues bluntly and dissection was carried  proximally along its stalk which seemed to be extending from the sinus tarsi and subtalar joint.  Then, 3-0 Vicryl was utilized to whip-stitch the stalk and the cyst was excised at the level of the sinus tarsi and passed off the surgical field. In total, it measured approximately 3 x 2 cm.  The incision was flushed with copious amounts of sterile saline.  The capsular tissue overlying the sinus tarsi and subtalar joint was repaired with 3-0 Vicryl as was the tendon sheath to the peroneus brevis tendon.  The attention was then once again flushed and the skin was closed in a layered fashion.  The tourniquet was then deflated and an immediate hyperemic response was noted in digits 1 through 5 on the left foot.  A dry soft dressing was applied and the patient was placed in a CAM boot for all weightbearing.

## 2018-09-19 NOTE — ANESTHESIA PREPROCEDURE EVALUATION
Anesthesia Evaluation     Patient summary reviewed   history of anesthetic complications: PONV  NPO Solid Status: > 8 hours  NPO Liquid Status: > 8 hours           Airway   Mallampati: II  TM distance: >3 FB  Neck ROM: full  No difficulty expected  Dental    (+) poor dentition    Pulmonary - normal exam   (+) a smoker Former, asthma, recent URI,   Cardiovascular - normal exam  Exercise tolerance: good (4-7 METS)        Neuro/Psych  (+) seizures, numbness,     GI/Hepatic/Renal/Endo      Musculoskeletal     Abdominal    Substance History      OB/GYN    (+) Pregnant,         Other   (+) arthritis                     Anesthesia Plan    ASA 3     general     intravenous induction   Anesthetic plan, all risks, benefits, and alternatives have been provided, discussed and informed consent has been obtained with: patient.

## 2018-09-19 NOTE — BRIEF OP NOTE
EXCISION LESION  Progress Note    Nilda Jerez  9/19/2018    Pre-op Diagnosis:   Soft tissue mass [M79.9]       Post-Op Diagnosis Codes:     * Soft tissue mass [M79.9]    Procedure/CPT® Codes:      Procedure(s):  EXCISION 7cm LIPOMA RIGHT LOWER FLANK 1091-2561    Surgeon(s):  Jose Guadalupe Trevino MD Bodily, Nathan, MD    Anesthesia: Choice    Staff:   Circulator: Ashly Calix RN  Scrub Person: Jazmine Mac; Urban Fish  Assistant: Ramona Machado MA; Teresa Og CSA    Estimated Blood Loss: minimal    Urine Voided: * No values recorded between 9/19/2018  7:04 AM and 9/19/2018  8:31 AM *    Specimens:                ID Type Source Tests Collected by Time   A : left ankle ganglion cyst Tissue Ankle, Left TISSUE PATHOLOGY EXAM Ismael Cuevas DPM 9/19/2018 0743   B : right flank lipoma Tissue Flank, right TISSUE PATHOLOGY EXAM Ismael Cuevas DPM 9/19/2018 0808         Drains:      Findings: 7cm lipoma right lower back    Complications: none        This document has been electronically signed by Jose Guadalupe Trevino MD on September 19, 2018 8:32 AM          Jose Guadalupe Trevino MD     Date: 9/19/2018  Time: 8:31 AM

## 2018-09-19 NOTE — ANESTHESIA POSTPROCEDURE EVALUATION
Patient: Nilda Jerez    Procedure Summary     Date:  09/19/18 Room / Location:  [facility] OR 03 / [facility] OR    Anesthesia Start:  0705 Anesthesia Stop:  0836    Procedures:       EXCISION LEFT ANKLE GANGLION      (DR. UGO SCHAEFER) [id number] (Left Foot)      EXCISION LIPOMA RIGHT LOWER FLANK 4473-1674 (Right ) Diagnosis:       Soft tissue mass      (Soft tissue mass [M79.9])    Surgeon:  Ismael Cuevas DPM; Jose Guadalupe Trevino MD Provider:  Seth Phipps MD    Anesthesia Type:  general ASA Status:  3          Anesthesia Type: general  Last vitals  BP   99/59 (09/19/18 0833)   Temp   97.1 °F (36.2 °C) (09/19/18 0833)   Pulse   97 (09/19/18 0833)   Resp   20 (09/19/18 0833)     SpO2   95 % (09/19/18 0833)     Post Anesthesia Care and Evaluation    Patient location during evaluation: bedside  Patient participation: complete - patient cannot participate  Level of consciousness: awake  Pain score: 0  Pain management: adequate  Airway patency: patent  Anesthetic complications: No anesthetic complications  PONV Status: none  Cardiovascular status: acceptable  Respiratory status: acceptable  Hydration status: acceptable

## 2018-09-19 NOTE — ANESTHESIA PROCEDURE NOTES
Airway  Urgency: elective    Airway not difficult    General Information and Staff    Patient location during procedure: OR  CRNA: MATHEW ZUNIGA    Indications and Patient Condition  Indications for airway management: airway protection    Preoxygenated: yes  Mask difficulty assessment: 1 - vent by mask    Final Airway Details  Final airway type: endotracheal airway      Successful airway: ETT  Cuffed: yes   Successful intubation technique: direct laryngoscopy  Facilitating devices/methods: intubating stylet  Endotracheal tube insertion site: oral  Blade: Charley  Blade size: 3  ETT size: 7.5 mm  Cormack-Lehane Classification: grade IIa - partial view of glottis  Placement verified by: chest auscultation, capnometry and palpation of cuff   Measured from: lips  ETT to lips (cm): 21  Number of attempts at approach: 1    Additional Comments  Atraumatic intubation by MD resident.

## 2018-09-19 NOTE — H&P
CHIEF COMPLAINT:    Right lower back lipoma    HISTORY OF PRESENT ILLNESS:    Nilda Jerez is a 38 y.o. female who has been seen previously for a symptomatic lipoma on the right lower back as well as a left ankle mass.  She is here today for excision of both with Dr. Cuevas and I.  Has no complaints this AM.    Past Medical History:   Diagnosis Date   • Acute serous otitis media    • Acute sinusitis    • Asthma    • Chronic pain    • Degeneration of lumbar intervertebral disc    • Encounter for gynecological examination    • Encounter for long-term (current) use of medications     other   • Ganglion cyst     Right wrist      • H/O infectious mononucleosis    • Ingrown toenail    • Low back pain    • Lumbar radiculopathy     Left   • Menometrorrhagia    • Need for prophylactic vaccination or inoculation against diphtheria and tetanus     Need for prophylactic vaccination and inoculation against Diptheria-tetanus-pertussis, combined [DTP] [DTaP]   • Neuropathy    • Pain in lower limb    • Patient currently pregnant     G 1, P 1    • PONV (postoperative nausea and vomiting)    • Routine history and physical examination of adult     Administrative   • Smoker    • Spinal stenosis of lumbar region    • Tenosynovitis     Tendinitis AND/OR tenosynovitis of the pelvic region      • Tibialis tendinitis    • Tobacco dependence syndrome    • Upper respiratory infection        Past Surgical History:   Procedure Laterality Date   • ANAL FISSURECTOMY      Removal of anal fissure (1)      •  SECTION     • HYSTEROSCOPY  2012    Hysteroscope procedure (1)      • INJECTION OF MEDICATION  2013    Kenalog (2)      • PAP SMEAR  10/02/2014   • VAGINAL DELIVERY         Prior to Admission medications    Medication Sig Start Date End Date Taking? Authorizing Provider   gabapentin (NEURONTIN) 600 MG tablet Take 1 tablet by mouth 3 (Three) Times a Day. 18  Yes Nellie Maurice, APRN  "  HYDROcodone-acetaminophen (NORCO) 5-325 MG per tablet Take 1 tablet by mouth Every 6 (Six) Hours As Needed for Moderate Pain . 9/7/18  Yes Ismael Cuevas, DPM   albuterol (PROVENTIL HFA;VENTOLIN HFA) 108 (90 BASE) MCG/ACT inhaler Inhale 2 puffs Every 4 (Four) Hours As Needed for wheezing.    Provider, MD Irish       No Known Allergies    Family History   Problem Relation Age of Onset   • Breast cancer Mother    • Diabetes Mother    • Hypertension Mother    • Diabetes Father    • Hypertension Father    • Crohn's disease Father    • Crohn's disease Sister        Social History     Social History   • Marital status:      Spouse name: N/A   • Number of children: N/A   • Years of education: N/A     Occupational History   • Not on file.     Social History Main Topics   • Smoking status: Current Every Day Smoker     Packs/day: 1.00     Years: 10.00   • Smokeless tobacco: Never Used   • Alcohol use Yes      Comment: Occasional   • Drug use: No   • Sexual activity: Defer     Other Topics Concern   • Not on file     Social History Narrative   • No narrative on file       Review of Systems   Constitutional: Negative for activity change and appetite change.   Respiratory: Negative for shortness of breath.    Gastrointestinal: Negative for abdominal pain.       Objective     /80   Pulse 81   Temp 97 °F (36.1 °C)   Resp 18   Ht 167.6 cm (66\")   Wt 87.4 kg (192 lb 10.9 oz)   SpO2 99%   BMI 31.10 kg/m²     Physical Exam   Constitutional: She is oriented to person, place, and time. She appears well-developed and well-nourished. No distress.   HENT:   Head: Normocephalic and atraumatic.   Eyes: Pupils are equal, round, and reactive to light. Conjunctivae and EOM are normal. Right eye exhibits no discharge. Left eye exhibits no discharge.   Neck: Normal range of motion. Neck supple. No JVD present. No tracheal deviation present. No thyromegaly present.   Cardiovascular: Normal rate, regular rhythm and " normal heart sounds.  Exam reveals no gallop and no friction rub.    No murmur heard.  Pulmonary/Chest: Effort normal and breath sounds normal. No respiratory distress. She has no wheezes. She has no rales. She exhibits no tenderness.   Abdominal: Soft. She exhibits no distension and no mass. There is no tenderness. There is no rebound and no guarding. No hernia.   Musculoskeletal: Normal range of motion. She exhibits no edema, tenderness or deformity.   Neurological: She is alert and oriented to person, place, and time. No cranial nerve deficit.   Skin: Skin is warm and dry. No rash noted. She is not diaphoretic. No erythema. No pallor.        Psychiatric: She has a normal mood and affect. Her behavior is normal. Judgment and thought content normal.         ASSESSMENT:    Lipoma of right lower back    PLAN:    Will plan for excision of right lower back/flank lipoma.  Risks and benefits have been discussed.  Site marked.          This document has been electronically signed by Jose Guadalupe Trevino MD on September 19, 2018 6:52 AM

## 2018-09-19 NOTE — INTERVAL H&P NOTE
H&P reviewed. The patient was examined and there are no changes to the H&P.           This document has been electronically signed by Ismael Cuevas DPM on September 19, 2018 6:55 AM

## 2018-09-19 NOTE — BRIEF OP NOTE
EXCISION FOOT TUMOR  Progress Note    Nilda Jerez  9/19/2018    Pre-op Diagnosis:   Soft tissue mass [M79.9]       Post-Op Diagnosis Codes:     * Soft tissue mass [M79.9]    Procedure/CPT® Codes:      Procedure(s):  EXCISION LEFT ANKLE GANGLION      (DR. ARIZMENDI FIRST)  EXCISION LIPOMA RIGHT LOWER FLANK    Surgeon(s):  Ismael Arizmendi DPM Armstrong, James Edward, MD    Anesthesia: Choice    Staff:   Circulator: Ashly Calix RN  Scrub Person: Jazmine Mac; Urban Fish  Assistant: Ramona Machado MA; Teresa Og CSA    Estimated Blood Loss: minimal    Urine Voided: * No values recorded between 9/19/2018  7:04 AM and 9/19/2018  7:59 AM *    Specimens:                ID Type Source Tests Collected by Time   A : left ankle ganglion cyst Tissue Ankle, Left TISSUE PATHOLOGY EXAM Ismael Arizmendi DPM 9/19/2018 0743         Drains:      Findings: Consistent with diagnosis    Complications: None          This document has been electronically signed by Ismael Arizmendi DPM on September 19, 2018 7:59 AM     Ismael Arizmendi DPM     Date: 9/19/2018  Time: 7:59 AM

## 2018-09-20 LAB
LAB AP CASE REPORT: NORMAL
PATH REPORT.FINAL DX SPEC: NORMAL
PATH REPORT.GROSS SPEC: NORMAL

## 2018-09-24 ENCOUNTER — OFFICE VISIT (OUTPATIENT)
Dept: PODIATRY | Facility: CLINIC | Age: 38
End: 2018-09-24

## 2018-09-24 VITALS — HEIGHT: 66 IN | BODY MASS INDEX: 31.5 KG/M2 | WEIGHT: 196 LBS

## 2018-09-24 DIAGNOSIS — M67.89 GANGLION AND CYST OF SYNOVIUM, TENDON AND BURSA: Primary | ICD-10-CM

## 2018-09-24 DIAGNOSIS — M71.39 GANGLION AND CYST OF SYNOVIUM, TENDON AND BURSA: Primary | ICD-10-CM

## 2018-09-24 DIAGNOSIS — M67.49 GANGLION AND CYST OF SYNOVIUM, TENDON AND BURSA: Primary | ICD-10-CM

## 2018-09-24 PROCEDURE — 99024 POSTOP FOLLOW-UP VISIT: CPT | Performed by: PODIATRIST

## 2018-09-24 RX ORDER — HYDROCODONE BITARTRATE AND ACETAMINOPHEN 10; 325 MG/1; MG/1
1 TABLET ORAL EVERY 6 HOURS PRN
Qty: 40 TABLET | Refills: 0 | Status: SHIPPED | OUTPATIENT
Start: 2018-09-24 | End: 2018-10-16

## 2018-09-24 NOTE — PROGRESS NOTES
Nilda Jerez  1980  38 y.o. female   Patient presents today for a post op visit after having surgery on 18.  2018      Chief Complaint   Patient presents with   • Left Ankle - Post-op           History of Present Illness    Nilda Jerez is a 38 y.o. female who presents for follow-up of excision left ankle mass.  Date of surgery 2018.  She is doing well overall.  She does note that she has been on her feet quite often and has taken all of her pain medication.    Past Medical History:   Diagnosis Date   • Acute serous otitis media    • Acute sinusitis    • Asthma    • Chronic pain    • Degeneration of lumbar intervertebral disc    • Encounter for gynecological examination    • Encounter for long-term (current) use of medications     other   • Ganglion cyst     Right wrist      • H/O infectious mononucleosis    • Ingrown toenail    • Low back pain    • Lumbar radiculopathy     Left   • Menometrorrhagia    • Need for prophylactic vaccination or inoculation against diphtheria and tetanus     Need for prophylactic vaccination and inoculation against Diptheria-tetanus-pertussis, combined [DTP] [DTaP]   • Neuropathy    • Pain in lower limb    • Patient currently pregnant     G 1, P 1    • PONV (postoperative nausea and vomiting)    • Routine history and physical examination of adult     Administrative   • Smoker    • Spinal stenosis of lumbar region    • Tenosynovitis     Tendinitis AND/OR tenosynovitis of the pelvic region      • Tibialis tendinitis    • Tobacco dependence syndrome    • Upper respiratory infection          Past Surgical History:   Procedure Laterality Date   • ANAL FISSURECTOMY      Removal of anal fissure (1)      •  SECTION     • EXCISION FOOT TUMOR Left 2018    Procedure: EXCISION LEFT ANKLE GANGLION      (DR. UGO SCHAEFER) [id number];  Surgeon: Ismael Cuevas DPM;  Location: [facility];  Service: Podiatry   • EXCISION LESION Right 2018     "Procedure: EXCISION LIPOMA RIGHT LOWER FLANK [id number];  Surgeon: Jose Guadalupe Trevino MD;  Location: [facility];  Service: General   • HYSTEROSCOPY  03/07/2012    Hysteroscope procedure (1)      • INJECTION OF MEDICATION  11/21/2013    Kenalog (2)      • PAP SMEAR  10/02/2014   • VAGINAL DELIVERY  2003         Family History   Problem Relation Age of Onset   • Breast cancer Mother    • Diabetes Mother    • Hypertension Mother    • Diabetes Father    • Hypertension Father    • Crohn's disease Father    • Crohn's disease Sister          Social History     Social History   • Marital status:      Spouse name: N/A   • Number of children: N/A   • Years of education: N/A     Occupational History   • Not on file.     Social History Main Topics   • Smoking status: Current Every Day Smoker     Packs/day: 1.00     Years: 10.00   • Smokeless tobacco: Never Used   • Alcohol use Yes      Comment: Occasional   • Drug use: No   • Sexual activity: Defer     Other Topics Concern   • Not on file     Social History Narrative   • No narrative on file         Current Outpatient Prescriptions   Medication Sig Dispense Refill   • albuterol (PROVENTIL HFA;VENTOLIN HFA) 108 (90 BASE) MCG/ACT inhaler Inhale 2 puffs Every 4 (Four) Hours As Needed for wheezing.     • gabapentin (NEURONTIN) 600 MG tablet Take 1 tablet by mouth 3 (Three) Times a Day. 90 tablet 2   • HYDROcodone-acetaminophen (NORCO)  MG per tablet Take 1 tablet by mouth Every 6 (Six) Hours As Needed for Moderate Pain . 40 tablet 0     No current facility-administered medications for this visit.      Facility-Administered Medications Ordered in Other Visits   Medication Dose Route Frequency Provider Last Rate Last Dose   • electrolyte-148 (PLASMALYTE) solution 1,000 mL  1,000 mL Intravenous Continuous Seth Phipps MD             OBJECTIVE    Ht 167.6 cm (66\")   Wt 88.9 kg (196 lb)   BMI 31.64 kg/m²       Review of Systems   Constitutional: Negative.    HENT: " Negative.    Eyes: Negative.    Respiratory: Negative.    Cardiovascular: Negative.    Gastrointestinal: Negative.    Endocrine: Negative.    Genitourinary: Negative.    Musculoskeletal: Positive for back pain.        Joint pain   Skin: Negative.    Allergic/Immunologic: Negative.    Neurological: Negative.    Hematological: Negative.    Psychiatric/Behavioral: The patient is nervous/anxious.          Physical Exam   Constitutional: he appears well-developed and well-nourished.   HEENT: Normocephalic. Atraumatic.  CV: No CP. RRR  Resp: Non-labored respirations.  Psychiatric: he has a normal mood and affect. his behavior is normal.         Lower Extremity Exam:  Vascular: DP/PT pulses palpable 2+.   Mild left ankle edema  Foot warm  Negative Epifanio  Neuro: Protective sensation intact, b/l.  Light touch sensation intact, b/l  DTRs intact  Integument: No open wounds or lesions.  No erythema, scaling  No masses  Left ankle incision coapted with sutures in place.  No signs of infection.  Musculoskeletal:  B/LE muscle strength 5/5.   Achilles, TA, TP, Peroneal tendons intact  No gross instability  Some tenderness palpation of left sinus tarsi, lateral ankle gutter          ASSESSMENT AND PLAN    Nilda was seen today for post-op.    Diagnoses and all orders for this visit:    Ganglion and cyst of synovium, tendon and bursa    Other orders  -     HYDROcodone-acetaminophen (NORCO)  MG per tablet; Take 1 tablet by mouth Every 6 (Six) Hours As Needed for Moderate Pain .      -Doing well postoperatively.  -Resting change today.  To be left clean dry and intact ×1 week  -Continue cam boot for all weightbearing.  Pain control.  -Recheck 1 week, suture removal.            This document has been electronically signed by Ismael Cuevas DPM on September 24, 2018 1:40 PM     EMR Dragon/Transcription disclaimer:   Much of this encounter note is an electronic transcription/translation of spoken language to printed text. The  electronic translation of spoken language may permit erroneous, or at times, nonsensical words or phrases to be inadvertently transcribed; Although I have reviewed the note for such errors, some may still exist.    Ismael Cuevas DPM  9/24/2018  1:40 PM

## 2018-09-27 ENCOUNTER — OFFICE VISIT (OUTPATIENT)
Dept: SURGERY | Facility: CLINIC | Age: 38
End: 2018-09-27

## 2018-09-27 VITALS
BODY MASS INDEX: 31.5 KG/M2 | TEMPERATURE: 97.7 F | HEART RATE: 77 BPM | SYSTOLIC BLOOD PRESSURE: 122 MMHG | DIASTOLIC BLOOD PRESSURE: 70 MMHG | HEIGHT: 66 IN | WEIGHT: 196 LBS

## 2018-09-27 DIAGNOSIS — Z09 FOLLOW UP: Primary | ICD-10-CM

## 2018-09-27 PROCEDURE — 99024 POSTOP FOLLOW-UP VISIT: CPT | Performed by: SURGERY

## 2018-09-30 NOTE — PROGRESS NOTES
CHIEF COMPLAINT:    Chief Complaint   Patient presents with   • Post-op     Excision right flank excision 9/19/18.       HISTORY OF PRESENT ILLNESS:    Nilda Jerez is a 38 y.o. female who underwent excision of a right flank lipoma 9/19/2018.  Pathology confirmed a lipoma in this area.  She also had a ganglion cyst removed from her left ankle at that time as well.  She notes no complaints related to her lipoma excision.    EXAM:  Vitals:    09/27/18 1108   BP: 122/70   Pulse: 77   Temp: 97.7 °F (36.5 °C)         Healing incision site on right lower back    ASSESSMENT:    Status post lipoma excision    PLAN:    Overall she appears to be healing poorly.  She can see me as needed.          This document has been electronically signed by Jose Guadalupe Trevino MD on September 30, 2018 3:19 PM

## 2018-10-01 ENCOUNTER — HOSPITAL ENCOUNTER (OUTPATIENT)
Dept: MRI IMAGING | Facility: HOSPITAL | Age: 38
Discharge: HOME | End: 2018-10-01
Admitting: NURSE PRACTITIONER

## 2018-10-01 DIAGNOSIS — M54.9 BACK PAIN, UNSPECIFIED BACK LOCATION, UNSPECIFIED BACK PAIN LATERALITY, UNSPECIFIED CHRONICITY: ICD-10-CM

## 2018-10-01 PROCEDURE — 72148 MRI LUMBAR SPINE W/O DYE: CPT

## 2018-10-04 ENCOUNTER — OFFICE VISIT (OUTPATIENT)
Dept: PODIATRY | Facility: CLINIC | Age: 38
End: 2018-10-04

## 2018-10-04 VITALS — BODY MASS INDEX: 31.5 KG/M2 | OXYGEN SATURATION: 98 % | WEIGHT: 196 LBS | HEART RATE: 92 BPM | HEIGHT: 66 IN

## 2018-10-04 DIAGNOSIS — M67.49 GANGLION AND CYST OF SYNOVIUM, TENDON AND BURSA: Primary | ICD-10-CM

## 2018-10-04 DIAGNOSIS — M67.89 GANGLION AND CYST OF SYNOVIUM, TENDON AND BURSA: Primary | ICD-10-CM

## 2018-10-04 DIAGNOSIS — M71.39 GANGLION AND CYST OF SYNOVIUM, TENDON AND BURSA: Primary | ICD-10-CM

## 2018-10-04 PROCEDURE — 99024 POSTOP FOLLOW-UP VISIT: CPT | Performed by: PODIATRIST

## 2018-10-04 NOTE — PROGRESS NOTES
Nilda Jerez  1980  38 y.o. female       Patient presents today for a post op visit after having surgery on 18.  States no pain today.  10/04/2018        Chief Complaint   Patient presents with   • Left Ankle - Post-op           History of Present Illness    Nilda Jerez is a 38 y.o. female who presents for follow-up of excision left ankle mass.  Date of surgery 2018.  She is doing well overall.  She reports minimal pain.  She is ready to get rid of the cam boot.    Past Medical History:   Diagnosis Date   • Acute serous otitis media    • Acute sinusitis    • Asthma    • Chronic pain    • Degeneration of lumbar intervertebral disc    • Encounter for gynecological examination    • Encounter for long-term (current) use of medications     other   • Ganglion cyst     Right wrist      • H/O infectious mononucleosis    • Ingrown toenail    • Low back pain    • Lumbar radiculopathy     Left   • Menometrorrhagia    • Need for prophylactic vaccination or inoculation against diphtheria and tetanus     Need for prophylactic vaccination and inoculation against Diptheria-tetanus-pertussis, combined [DTP] [DTaP]   • Neuropathy    • Pain in lower limb    • Patient currently pregnant     G 1, P 1    • PONV (postoperative nausea and vomiting)    • Routine history and physical examination of adult     Administrative   • Smoker    • Spinal stenosis of lumbar region    • Tenosynovitis     Tendinitis AND/OR tenosynovitis of the pelvic region      • Tibialis tendinitis    • Tobacco dependence syndrome    • Upper respiratory infection          Past Surgical History:   Procedure Laterality Date   • ANAL FISSURECTOMY      Removal of anal fissure (1)      •  SECTION     • EXCISION FOOT TUMOR Left 2018    Procedure: EXCISION LEFT ANKLE GANGLION      (DR. UGO SCHAEFER) 9030-5245;  Surgeon: Ismael Cuevas DPM;  Location: [facility];  Service: Podiatry   • EXCISION LESION Right 2018     "Procedure: EXCISION LIPOMA RIGHT LOWER FLANK 4668-6510;  Surgeon: Jose Guadalupe Trevino MD;  Location: [facility] OR;  Service: General   • HYSTEROSCOPY  03/07/2012    Hysteroscope procedure (1)      • INJECTION OF MEDICATION  11/21/2013    Kenalog (2)      • PAP SMEAR  10/02/2014   • VAGINAL DELIVERY  2003         Family History   Problem Relation Age of Onset   • Breast cancer Mother    • Diabetes Mother    • Hypertension Mother    • Diabetes Father    • Hypertension Father    • Crohn's disease Father    • Crohn's disease Sister          Social History     Social History   • Marital status:      Spouse name: N/A   • Number of children: N/A   • Years of education: N/A     Occupational History   • Not on file.     Social History Main Topics   • Smoking status: Current Every Day Smoker     Packs/day: 1.00     Years: 10.00   • Smokeless tobacco: Never Used   • Alcohol use Yes      Comment: Occasional   • Drug use: No   • Sexual activity: Defer     Other Topics Concern   • Not on file     Social History Narrative   • No narrative on file         Current Outpatient Prescriptions   Medication Sig Dispense Refill   • albuterol (PROVENTIL HFA;VENTOLIN HFA) 108 (90 BASE) MCG/ACT inhaler Inhale 2 puffs Every 4 (Four) Hours As Needed for wheezing.     • gabapentin (NEURONTIN) 600 MG tablet Take 1 tablet by mouth 3 (Three) Times a Day. 90 tablet 2   • HYDROcodone-acetaminophen (NORCO)  MG per tablet Take 1 tablet by mouth Every 6 (Six) Hours As Needed for Moderate Pain . 40 tablet 0     No current facility-administered medications for this visit.      Facility-Administered Medications Ordered in Other Visits   Medication Dose Route Frequency Provider Last Rate Last Dose   • electrolyte-148 (PLASMALYTE) solution 1,000 mL  1,000 mL Intravenous Continuous Seth Phipps MD             OBJECTIVE    Pulse 92   Ht 167.6 cm (66\")   Wt 88.9 kg (196 lb)   SpO2 98%   Breastfeeding? No   BMI 31.64 kg/m²       Review of " Systems   Constitutional: Negative.    HENT: Negative.    Eyes: Negative.    Respiratory: Negative.    Cardiovascular: Negative.    Gastrointestinal: Negative.    Endocrine: Negative.    Genitourinary: Negative.    Musculoskeletal: Positive for back pain.        Joint pain   Skin: Negative.    Allergic/Immunologic: Negative.    Neurological: Negative.    Hematological: Negative.    Psychiatric/Behavioral: The patient is nervous/anxious.          Physical Exam   Constitutional: he appears well-developed and well-nourished.   HEENT: Normocephalic. Atraumatic.  CV: No CP. RRR  Resp: Non-labored respirations.  Psychiatric: he has a normal mood and affect. his behavior is normal.         Lower Extremity Exam:  Vascular: DP/PT pulses palpable 2+.   Minimal left ankle edema  Foot warm  Negative Epifanio  Neuro: Protective sensation intact, b/l.  Light touch sensation intact, b/l  DTRs intact  Integument: No open wounds or lesions.  No erythema, scaling  No masses  Left ankle incision coapted with sutures in place.  No signs of infection.  Musculoskeletal:  B/LE muscle strength 5/5.   Achilles, TA, TP, Peroneal tendons intact  No gross instability            ASSESSMENT AND PLAN    Nilda was seen today for post-op.    Diagnoses and all orders for this visit:    Ganglion and cyst of synovium, tendon and bursa      -Doing well postoperatively.  -Sutures removed today.  There is some mild partial-thickness breakdown of the incision centrally.  Applied masses on Steri-Strips advised to keep incision covered over next 2 weeks.  -Wean CAM boot as tolerated  -Recheck 2 weeks             This document has been electronically signed by Ismael Cuevas DPM on October 4, 2018 12:53 PM     EMR Dragon/Transcription disclaimer:   Much of this encounter note is an electronic transcription/translation of spoken language to printed text. The electronic translation of spoken language may permit erroneous, or at times, nonsensical words or  phrases to be inadvertently transcribed; Although I have reviewed the note for such errors, some may still exist.    Ismael Cuevas DPM  10/4/2018  12:53 PM

## 2018-10-16 ENCOUNTER — TELEPHONE (OUTPATIENT)
Dept: PODIATRY | Facility: CLINIC | Age: 38
End: 2018-10-16

## 2018-10-16 RX ORDER — IBUPROFEN 800 MG/1
800 TABLET ORAL 3 TIMES DAILY
Qty: 90 TABLET | Refills: 0 | Status: SHIPPED | OUTPATIENT
Start: 2018-10-16 | End: 2019-03-14

## 2018-10-16 RX ORDER — HYDROCODONE BITARTRATE AND ACETAMINOPHEN 5; 325 MG/1; MG/1
TABLET ORAL
Qty: 40 TABLET | Refills: 0 | Status: SHIPPED | OUTPATIENT
Start: 2018-10-16 | End: 2018-10-22

## 2018-10-16 NOTE — TELEPHONE ENCOUNTER
UGO      STARTED WORKING PART TIME YESTERDAY AND SAID SHE TWISTED HER ANKLE.    SAYS IT IS SWOLLEN AND PAINFUL.    WANTS TO KNOW IF YOU CAN CALL HER IN SOMETHING.

## 2018-10-16 NOTE — TELEPHONE ENCOUNTER
She will have to come in to get a pain script and he will call in an anti-inflammatory in to the pharmacy. I have spoke with patient

## 2018-10-18 ENCOUNTER — OFFICE VISIT (OUTPATIENT)
Dept: PODIATRY | Facility: CLINIC | Age: 38
End: 2018-10-18

## 2018-10-18 VITALS — OXYGEN SATURATION: 99 % | BODY MASS INDEX: 31.5 KG/M2 | HEART RATE: 82 BPM | WEIGHT: 195.99 LBS | HEIGHT: 66 IN

## 2018-10-18 DIAGNOSIS — M71.39 GANGLION AND CYST OF SYNOVIUM, TENDON AND BURSA: Primary | ICD-10-CM

## 2018-10-18 DIAGNOSIS — M67.49 GANGLION AND CYST OF SYNOVIUM, TENDON AND BURSA: Primary | ICD-10-CM

## 2018-10-18 DIAGNOSIS — M25.572 LEFT ANKLE PAIN, UNSPECIFIED CHRONICITY: ICD-10-CM

## 2018-10-18 DIAGNOSIS — M67.89 GANGLION AND CYST OF SYNOVIUM, TENDON AND BURSA: Primary | ICD-10-CM

## 2018-10-18 PROCEDURE — 99024 POSTOP FOLLOW-UP VISIT: CPT | Performed by: PODIATRIST

## 2018-10-18 NOTE — PROGRESS NOTES
Nilda Jerez  1980  38 y.o. female       Patient presents today for follow up of the left ankle. She states she is in no pain today but has had some pain since returning to work.         Chief Complaint   Patient presents with   • Left Ankle - Post-op Follow-up           History of Present Illness    Nilda Jerez is a 38 y.o. female who presents for follow-up of excision left ankle mass.  Date of surgery 2018.  She is doing well overall.  Has returned to work without significant issue. Reports that she hit her foot on a door facing two days prior with some increase in pain. No pain today.     Past Medical History:   Diagnosis Date   • Acute serous otitis media    • Acute sinusitis    • Asthma    • Chronic pain    • Degeneration of lumbar intervertebral disc    • Encounter for gynecological examination    • Encounter for long-term (current) use of medications     other   • Ganglion cyst     Right wrist      • H/O infectious mononucleosis    • Ingrown toenail    • Low back pain    • Lumbar radiculopathy     Left   • Menometrorrhagia    • Need for prophylactic vaccination or inoculation against diphtheria and tetanus     Need for prophylactic vaccination and inoculation against Diptheria-tetanus-pertussis, combined [DTP] [DTaP]   • Neuropathy    • Pain in lower limb    • Patient currently pregnant     G 1, P 1    • PONV (postoperative nausea and vomiting)    • Routine history and physical examination of adult     Administrative   • Smoker    • Spinal stenosis of lumbar region    • Tenosynovitis     Tendinitis AND/OR tenosynovitis of the pelvic region      • Tibialis tendinitis    • Tobacco dependence syndrome    • Upper respiratory infection          Past Surgical History:   Procedure Laterality Date   • ANAL FISSURECTOMY      Removal of anal fissure (1)      •  SECTION     • EXCISION FOOT TUMOR Left 2018    Procedure: EXCISION LEFT ANKLE GANGLION      (DR. UGO SCHAEFER)  1067-2825;  Surgeon: Ismael Cuevas DPM;  Location: [facility];  Service: Podiatry   • EXCISION LESION Right 9/19/2018    Procedure: EXCISION LIPOMA RIGHT LOWER FLANK 8648-8111;  Surgeon: Jose Guadalupe Trevino MD;  Location: [facility];  Service: General   • HYSTEROSCOPY  03/07/2012    Hysteroscope procedure (1)      • INJECTION OF MEDICATION  11/21/2013    Kenalog (2)      • PAP SMEAR  10/02/2014   • VAGINAL DELIVERY  2003         Family History   Problem Relation Age of Onset   • Breast cancer Mother    • Diabetes Mother    • Hypertension Mother    • Diabetes Father    • Hypertension Father    • Crohn's disease Father    • Crohn's disease Sister          Social History     Social History   • Marital status:      Spouse name: N/A   • Number of children: N/A   • Years of education: N/A     Occupational History   • Not on file.     Social History Main Topics   • Smoking status: Current Every Day Smoker     Packs/day: 1.00     Years: 10.00   • Smokeless tobacco: Never Used   • Alcohol use Yes      Comment: Occasional   • Drug use: No   • Sexual activity: Defer     Other Topics Concern   • Not on file     Social History Narrative   • No narrative on file         Current Outpatient Prescriptions   Medication Sig Dispense Refill   • albuterol (PROVENTIL HFA;VENTOLIN HFA) 108 (90 BASE) MCG/ACT inhaler Inhale 2 puffs Every 4 (Four) Hours As Needed for wheezing.     • HYDROcodone-acetaminophen (NORCO) 5-325 MG per tablet Take one to two tablets every 4-6 hours prn pain 40 tablet 0   • ibuprofen (ADVIL,MOTRIN) 800 MG tablet Take 1 tablet by mouth 3 (Three) Times a Day. 90 tablet 0     No current facility-administered medications for this visit.      Facility-Administered Medications Ordered in Other Visits   Medication Dose Route Frequency Provider Last Rate Last Dose   • electrolyte-148 (PLASMALYTE) solution 1,000 mL  1,000 mL Intravenous Continuous Seth Phipps MD             OBJECTIVE    Pulse 82   Ht  "167.6 cm (65.98\")   Wt 88.9 kg (195 lb 15.8 oz)   SpO2 99%   BMI 31.65 kg/m²       Review of Systems   Constitutional: Negative.    HENT: Negative.    Eyes: Negative.    Respiratory: Negative.    Cardiovascular: Negative.    Gastrointestinal: Negative.    Endocrine: Negative.    Genitourinary: Negative.    Musculoskeletal: Positive for back pain.        Joint pain   Skin: Negative.    Allergic/Immunologic: Negative.    Neurological: Negative.    Hematological: Negative.    Psychiatric/Behavioral: The patient is nervous/anxious.          Physical Exam   Constitutional: he appears well-developed and well-nourished.   HEENT: Normocephalic. Atraumatic.  CV: No CP. RRR  Resp: Non-labored respirations.  Psychiatric: he has a normal mood and affect. his behavior is normal.         Lower Extremity Exam:  Vascular: DP/PT pulses palpable 2+.   Minimal left ankle edema  Foot warm  Negative Epifanio  Neuro: Protective sensation intact, b/l.  Light touch sensation intact, b/l  DTRs intact  Integument: No open wounds or lesions.  No erythema, scaling  No masses  Left ankle incision predominantly healed with mild partial thickness breakdown centrally.  No signs of infection.  Musculoskeletal:  B/LE muscle strength 5/5.   Achilles, TA, TP, Peroneal tendons intact  No gross instability            ASSESSMENT AND PLAN    Nilda was seen today for post-op follow-up.    Diagnoses and all orders for this visit:    Ganglion and cyst of synovium, tendon and bursa    Left ankle pain, unspecified chronicity      -Doing well postoperatively.  -Continue local incisional care  -Motrin 800 for pain  -D/c Cam boot, increase activity as tolerated  -Recheck as needed            This document has been electronically signed by Ismael Cuevas DPM on October 18, 2018 7:23 PM     EMR Dragon/Transcription disclaimer:   Much of this encounter note is an electronic transcription/translation of spoken language to printed text. The electronic " translation of spoken language may permit erroneous, or at times, nonsensical words or phrases to be inadvertently transcribed; Although I have reviewed the note for such errors, some may still exist.    Ismael Cuevas DPM  10/18/2018  7:23 PM

## 2018-11-01 ENCOUNTER — OFFICE VISIT (OUTPATIENT)
Dept: PODIATRY | Facility: CLINIC | Age: 38
End: 2018-11-01

## 2018-11-01 VITALS — BODY MASS INDEX: 32.14 KG/M2 | HEIGHT: 66 IN | WEIGHT: 200 LBS

## 2018-11-01 DIAGNOSIS — T81.41XA ABSCESS INVOLVING SUTURE: Primary | ICD-10-CM

## 2018-11-01 DIAGNOSIS — M71.39 GANGLION AND CYST OF SYNOVIUM, TENDON AND BURSA: ICD-10-CM

## 2018-11-01 DIAGNOSIS — M67.49 GANGLION AND CYST OF SYNOVIUM, TENDON AND BURSA: ICD-10-CM

## 2018-11-01 DIAGNOSIS — M67.89 GANGLION AND CYST OF SYNOVIUM, TENDON AND BURSA: ICD-10-CM

## 2018-11-01 PROCEDURE — 99024 POSTOP FOLLOW-UP VISIT: CPT | Performed by: PODIATRIST

## 2018-11-01 NOTE — PROGRESS NOTES
Nilda Jerez  1980  38 y.o. female       Patient here for a follow up on left ankle, she states that the incision has a yellowish drainage, and a throbbing pain       Chief Complaint   Patient presents with   • Left Ankle - Pain           History of Present Illness    Nilda Jerez is a 38 y.o. female who presents for follow-up of excision left ankle mass.  Date of surgery 2018.  She had been doing very well but feels that a portion of her incision opened started draining and became more painful.  She states it has improved slightly over the past couple of days.    Past Medical History:   Diagnosis Date   • Acute serous otitis media    • Acute sinusitis    • Asthma    • Chronic pain    • Degeneration of lumbar intervertebral disc    • Encounter for gynecological examination    • Encounter for long-term (current) use of medications     other   • Ganglion cyst     Right wrist      • H/O infectious mononucleosis    • Ingrown toenail    • Low back pain    • Lumbar radiculopathy     Left   • Menometrorrhagia    • Need for prophylactic vaccination or inoculation against diphtheria and tetanus     Need for prophylactic vaccination and inoculation against Diptheria-tetanus-pertussis, combined [DTP] [DTaP]   • Neuropathy    • Pain in lower limb    • Patient currently pregnant     G 1, P 1    • PONV (postoperative nausea and vomiting)    • Routine history and physical examination of adult     Administrative   • Smoker    • Spinal stenosis of lumbar region    • Tenosynovitis     Tendinitis AND/OR tenosynovitis of the pelvic region      • Tibialis tendinitis    • Tobacco dependence syndrome    • Upper respiratory infection          Past Surgical History:   Procedure Laterality Date   • ANAL FISSURECTOMY      Removal of anal fissure (1)      •  SECTION     • EXCISION FOOT TUMOR Left 2018    Procedure: EXCISION LEFT ANKLE GANGLION      (DR. UGO SCHAEFER) 5940-0986;  Surgeon: Ugo  Ismael ALCAZAR DPM;  Location: [facility];  Service: Podiatry   • EXCISION LESION Right 9/19/2018    Procedure: EXCISION LIPOMA RIGHT LOWER FLANK 6219-5189;  Surgeon: Jose Guadalupe Trevino MD;  Location: [facility];  Service: General   • HYSTEROSCOPY  03/07/2012    Hysteroscope procedure (1)      • INJECTION OF MEDICATION  11/21/2013    Kenalog (2)      • PAP SMEAR  10/02/2014   • VAGINAL DELIVERY  2003         Family History   Problem Relation Age of Onset   • Breast cancer Mother    • Diabetes Mother    • Hypertension Mother    • Diabetes Father    • Hypertension Father    • Crohn's disease Father    • Crohn's disease Sister          Social History     Social History   • Marital status:      Spouse name: N/A   • Number of children: N/A   • Years of education: N/A     Occupational History   • Not on file.     Social History Main Topics   • Smoking status: Current Every Day Smoker     Packs/day: 1.00     Years: 10.00   • Smokeless tobacco: Never Used   • Alcohol use Yes      Comment: Occasional   • Drug use: No   • Sexual activity: Defer     Other Topics Concern   • Not on file     Social History Narrative   • No narrative on file         Current Outpatient Prescriptions   Medication Sig Dispense Refill   • albuterol (PROVENTIL HFA;VENTOLIN HFA) 108 (90 BASE) MCG/ACT inhaler Inhale 2 puffs Every 4 (Four) Hours As Needed for wheezing.     • azithromycin (ZITHROMAX) 250 MG tablet Take 2 tablets the first day, then 1 tablet daily for 4 days. 6 tablet 0   • benzonatate (TESSALON) 200 MG capsule Take 1 capsule by mouth 3 (Three) Times a Day As Needed for Cough. 30 capsule 0   • ibuprofen (ADVIL,MOTRIN) 800 MG tablet Take 1 tablet by mouth 3 (Three) Times a Day. 90 tablet 0     No current facility-administered medications for this visit.      Facility-Administered Medications Ordered in Other Visits   Medication Dose Route Frequency Provider Last Rate Last Dose   • electrolyte-148 (PLASMALYTE) solution 1,000 mL  1,000  "mL Intravenous Continuous Seth Phipps MD             OBJECTIVE    Ht 167.6 cm (66\")   Wt 90.7 kg (200 lb)   BMI 32.28 kg/m²       Review of Systems   Constitutional: Negative.    HENT: Negative.    Eyes: Negative.    Respiratory: Negative.    Cardiovascular: Negative.    Gastrointestinal: Negative.    Endocrine: Negative.    Genitourinary: Negative.    Musculoskeletal: Positive for back pain.        Joint pain   Skin: Negative.    Allergic/Immunologic: Negative.    Neurological: Negative.    Hematological: Negative.    Psychiatric/Behavioral: The patient is nervous/anxious.          Physical Exam   Constitutional: he appears well-developed and well-nourished.   HEENT: Normocephalic. Atraumatic.  CV: No CP. RRR  Resp: Non-labored respirations.  Psychiatric: he has a normal mood and affect. his behavior is normal.         Lower Extremity Exam:  Vascular: DP/PT pulses palpable 2+.   Minimal left ankle edema  Foot warm  Negative Epifanio  Neuro: Protective sensation intact, b/l.  Light touch sensation intact, b/l  DTRs intact  Integument: No open wounds or lesions.  No erythema, scaling  No masses  Left ankle incision predominantly healed with mild partial thickness breakdown centrally.  No signs of infection.  Exposed Vicryl within the incision which was removed.  Musculoskeletal:  B/LE muscle strength 5/5.   Achilles, TA, TP, Peroneal tendons intact  No gross instability            ASSESSMENT AND PLAN    Nilda was seen today for pain.    Diagnoses and all orders for this visit:    Abscess involving suture    Ganglion and cyst of synovium, tendon and bursa      -Suspect increased pain and drainage due to sterile suture abscess.  Exposed Vicryl removed from the incision today.  -Advised local wound care  -Recheck 2 weeks            This document has been electronically signed by Ismael Cuevas DPM on November 3, 2018 2:36 PM     EMR Dragon/Transcription disclaimer:   Much of this encounter note is an " electronic transcription/translation of spoken language to printed text. The electronic translation of spoken language may permit erroneous, or at times, nonsensical words or phrases to be inadvertently transcribed; Although I have reviewed the note for such errors, some may still exist.    Ismael Cuevas DPM  11/3/2018  2:36 PM

## 2018-11-15 ENCOUNTER — OFFICE VISIT (OUTPATIENT)
Dept: PODIATRY | Facility: CLINIC | Age: 38
End: 2018-11-15

## 2018-11-15 VITALS — HEART RATE: 91 BPM | WEIGHT: 200 LBS | HEIGHT: 66 IN | OXYGEN SATURATION: 98 % | BODY MASS INDEX: 32.14 KG/M2

## 2018-11-15 DIAGNOSIS — M71.39 GANGLION AND CYST OF SYNOVIUM, TENDON AND BURSA: ICD-10-CM

## 2018-11-15 DIAGNOSIS — M67.89 GANGLION AND CYST OF SYNOVIUM, TENDON AND BURSA: ICD-10-CM

## 2018-11-15 DIAGNOSIS — T81.41XA ABSCESS INVOLVING SUTURE: Primary | ICD-10-CM

## 2018-11-15 DIAGNOSIS — M67.49 GANGLION AND CYST OF SYNOVIUM, TENDON AND BURSA: ICD-10-CM

## 2018-11-15 PROCEDURE — 99024 POSTOP FOLLOW-UP VISIT: CPT | Performed by: PODIATRIST

## 2018-11-15 NOTE — PROGRESS NOTES
Nilda Jerez  1980  38 y.o. female       Patient presents today for follow up of left ankle. She states her pain is 5 or 6/10       Chief Complaint   Patient presents with   • Left Ankle - Follow-up       History of Present Illness    Nilda Jerez is a 38 y.o. female who presents for follow-up of excision left ankle mass.  Date of surgery 2018.  Experienced a small suture abscess, which is improving.    Past Medical History:   Diagnosis Date   • Acute serous otitis media    • Acute sinusitis    • Asthma    • Chronic pain    • Degeneration of lumbar intervertebral disc    • Encounter for gynecological examination    • Encounter for long-term (current) use of medications     other   • Ganglion cyst     Right wrist      • H/O infectious mononucleosis    • Ingrown toenail    • Low back pain    • Lumbar radiculopathy     Left   • Menometrorrhagia    • Need for prophylactic vaccination or inoculation against diphtheria and tetanus     Need for prophylactic vaccination and inoculation against Diptheria-tetanus-pertussis, combined [DTP] [DTaP]   • Neuropathy    • Pain in lower limb    • Patient currently pregnant     G 1, P 1    • PONV (postoperative nausea and vomiting)    • Routine history and physical examination of adult     Administrative   • Smoker    • Spinal stenosis of lumbar region    • Tenosynovitis     Tendinitis AND/OR tenosynovitis of the pelvic region      • Tibialis tendinitis    • Tobacco dependence syndrome    • Upper respiratory infection          Past Surgical History:   Procedure Laterality Date   • ANAL FISSURECTOMY      Removal of anal fissure (1)      •  SECTION     • HYSTEROSCOPY  2012    Hysteroscope procedure (1)      • INJECTION OF MEDICATION  2013    Kenalog (2)      • PAP SMEAR  10/02/2014   • VAGINAL DELIVERY           Family History   Problem Relation Age of Onset   • Breast cancer Mother    • Diabetes Mother    • Hypertension Mother   "  • Diabetes Father    • Hypertension Father    • Crohn's disease Father    • Crohn's disease Sister          Social History     Socioeconomic History   • Marital status:      Spouse name: Not on file   • Number of children: Not on file   • Years of education: Not on file   • Highest education level: Not on file   Social Needs   • Financial resource strain: Not on file   • Food insecurity - worry: Not on file   • Food insecurity - inability: Not on file   • Transportation needs - medical: Not on file   • Transportation needs - non-medical: Not on file   Occupational History   • Not on file   Tobacco Use   • Smoking status: Current Every Day Smoker     Packs/day: 1.00     Years: 10.00     Pack years: 10.00   • Smokeless tobacco: Never Used   Substance and Sexual Activity   • Alcohol use: Yes     Comment: Occasional   • Drug use: No   • Sexual activity: Defer     Partners: Male   Other Topics Concern   • Not on file   Social History Narrative   • Not on file         Current Outpatient Medications   Medication Sig Dispense Refill   • albuterol (PROVENTIL HFA;VENTOLIN HFA) 108 (90 BASE) MCG/ACT inhaler Inhale 2 puffs Every 4 (Four) Hours As Needed for wheezing.     • azithromycin (ZITHROMAX) 250 MG tablet Take 2 tablets the first day, then 1 tablet daily for 4 days. 6 tablet 0   • benzonatate (TESSALON) 200 MG capsule Take 1 capsule by mouth 3 (Three) Times a Day As Needed for Cough. 30 capsule 0   • ibuprofen (ADVIL,MOTRIN) 800 MG tablet Take 1 tablet by mouth 3 (Three) Times a Day. 90 tablet 0     No current facility-administered medications for this visit.      Facility-Administered Medications Ordered in Other Visits   Medication Dose Route Frequency Provider Last Rate Last Dose   • electrolyte-148 (PLASMALYTE) solution 1,000 mL  1,000 mL Intravenous Continuous Seth Phipps MD             OBJECTIVE    Pulse 91   Ht 167.6 cm (65.98\")   Wt 90.7 kg (200 lb)   SpO2 98%   BMI 32.30 kg/m²       Review of " Systems   Constitutional: Negative.    HENT: Negative.    Eyes: Negative.    Respiratory: Negative.    Cardiovascular: Negative.    Gastrointestinal: Negative.    Endocrine: Negative.    Genitourinary: Negative.    Musculoskeletal: Positive for back pain.        Joint pain   Skin: Negative.    Allergic/Immunologic: Negative.    Neurological: Negative.    Hematological: Negative.    Psychiatric/Behavioral: The patient is nervous/anxious.          Physical Exam   Constitutional: he appears well-developed and well-nourished.   HEENT: Normocephalic. Atraumatic.  CV: No CP. RRR  Resp: Non-labored respirations.  Psychiatric: he has a normal mood and affect. his behavior is normal.         Lower Extremity Exam:  Vascular: DP/PT pulses palpable 2+.   Minimal left ankle edema  Foot warm  Negative Epifanio  Neuro: Protective sensation intact, b/l.  Light touch sensation intact, b/l  DTRs intact  Integument: No open wounds or lesions.  No erythema, scaling  No masses  Left ankle incision predominantly healed with mild partial thickness breakdown centrally.  No signs of infection.    Musculoskeletal:  B/LE muscle strength 5/5.   Achilles, TA, TP, Peroneal tendons intact  No gross instability            ASSESSMENT AND PLAN    Nilda was seen today for follow-up.    Diagnoses and all orders for this visit:    Abscess involving suture    Ganglion and cyst of synovium, tendon and bursa      -Continue local wound care  -Recheck 2 weeks, PRN            This document has been electronically signed by Ismael Cuevas DPM on November 18, 2018 12:02 PM     EMR Dragon/Transcription disclaimer:   Much of this encounter note is an electronic transcription/translation of spoken language to printed text. The electronic translation of spoken language may permit erroneous, or at times, nonsensical words or phrases to be inadvertently transcribed; Although I have reviewed the note for such errors, some may still exist.    Ismael Cuevas,  INDIRA  11/18/2018  12:02 PM

## 2018-11-26 RX ORDER — GABAPENTIN 600 MG/1
600 TABLET ORAL 3 TIMES DAILY
Qty: 90 TABLET | Refills: 2 | OUTPATIENT
Start: 2018-11-26

## 2018-12-04 ENCOUNTER — OFFICE VISIT (OUTPATIENT)
Dept: FAMILY MEDICINE CLINIC | Facility: CLINIC | Age: 38
End: 2018-12-04

## 2018-12-04 VITALS
DIASTOLIC BLOOD PRESSURE: 72 MMHG | BODY MASS INDEX: 32.14 KG/M2 | HEART RATE: 100 BPM | TEMPERATURE: 96.2 F | WEIGHT: 200 LBS | SYSTOLIC BLOOD PRESSURE: 110 MMHG | HEIGHT: 66 IN | OXYGEN SATURATION: 94 %

## 2018-12-04 DIAGNOSIS — G62.9 NEUROPATHY: Primary | ICD-10-CM

## 2018-12-04 DIAGNOSIS — M54.16 LUMBAR RADICULOPATHY: ICD-10-CM

## 2018-12-04 DIAGNOSIS — J01.10 ACUTE NON-RECURRENT FRONTAL SINUSITIS: ICD-10-CM

## 2018-12-04 PROCEDURE — 99214 OFFICE O/P EST MOD 30 MIN: CPT | Performed by: NURSE PRACTITIONER

## 2018-12-04 RX ORDER — METHYLPREDNISOLONE 4 MG/1
TABLET ORAL
Qty: 1 EACH | Refills: 0 | OUTPATIENT
Start: 2018-12-04 | End: 2019-01-22

## 2018-12-04 RX ORDER — CEFDINIR 300 MG/1
300 CAPSULE ORAL 2 TIMES DAILY
Qty: 20 CAPSULE | Refills: 0 | OUTPATIENT
Start: 2018-12-04 | End: 2019-01-22

## 2018-12-04 RX ORDER — GABAPENTIN 600 MG/1
600 TABLET ORAL 3 TIMES DAILY
Qty: 90 TABLET | Refills: 2 | Status: SHIPPED | OUTPATIENT
Start: 2018-12-04 | End: 2019-03-14 | Stop reason: SDUPTHER

## 2018-12-04 RX ORDER — BROMPHENIRAMINE MALEATE, PSEUDOEPHEDRINE HYDROCHLORIDE, AND DEXTROMETHORPHAN HYDROBROMIDE 2; 30; 10 MG/5ML; MG/5ML; MG/5ML
5 SYRUP ORAL 4 TIMES DAILY PRN
Qty: 118 ML | Refills: 0 | OUTPATIENT
Start: 2018-12-04 | End: 2019-01-22

## 2018-12-04 NOTE — PROGRESS NOTES
Subjective   Nilda Jreez is a 38 y.o. female. Patient here today with complaints of Med Refill  pt here today with complaints of cough producing clear sputum, nasal congestion, watery eyes. Symptoms off and on x months. Improved for approx 1 week after she was seen at urgent care but then returned. These records are reivewed. Was given zithromax and tessalon, steroid inj. Also here for recheck of neuropathy, on gabapentin. States symptoms well controlled without side effects of medicine    Vitals:    12/04/18 0843   BP: 110/72   Pulse: 100   Temp: 96.2 °F (35.7 °C)   SpO2: 94%     Past Medical History:   Diagnosis Date   • Acute serous otitis media    • Acute sinusitis    • Asthma    • Chronic pain    • Degeneration of lumbar intervertebral disc    • Encounter for gynecological examination    • Encounter for long-term (current) use of medications     other   • Ganglion cyst     Right wrist      • H/O infectious mononucleosis    • Ingrown toenail    • Low back pain    • Lumbar radiculopathy     Left   • Menometrorrhagia    • Need for prophylactic vaccination or inoculation against diphtheria and tetanus     Need for prophylactic vaccination and inoculation against Diptheria-tetanus-pertussis, combined [DTP] [DTaP]   • Neuropathy    • Pain in lower limb    • Patient currently pregnant     G 1, P 1    • PONV (postoperative nausea and vomiting)    • Routine history and physical examination of adult     Administrative   • Smoker    • Spinal stenosis of lumbar region    • Tenosynovitis     Tendinitis AND/OR tenosynovitis of the pelvic region      • Tibialis tendinitis    • Tobacco dependence syndrome    • Upper respiratory infection      Lower Extremity Issue   This is a chronic problem. The current episode started more than 1 year ago. The problem occurs constantly. The problem has been waxing and waning. Associated symptoms include congestion, coughing, headaches and a sore throat. Treatments tried:  gabapentin. The treatment provided significant relief.   Sinusitis   This is a recurrent problem. The current episode started more than 1 month ago. The problem has been waxing and waning since onset. There has been no fever. The pain is mild. Associated symptoms include congestion, coughing, headaches, a hoarse voice, sneezing and a sore throat. Past treatments include oral decongestants, spray decongestants and antibiotics. The treatment provided mild relief.        The following portions of the patient's history were reviewed and updated as appropriate: allergies, current medications, past family history, past medical history, past social history, past surgical history and problem list.    Review of Systems   Constitutional: Negative.    HENT: Positive for congestion, hoarse voice, sneezing and sore throat.    Eyes: Negative.    Respiratory: Positive for cough.    Cardiovascular: Negative.    Gastrointestinal: Negative.    Endocrine: Negative.    Genitourinary: Negative.    Musculoskeletal: Negative.    Skin: Negative.    Allergic/Immunologic: Negative.    Neurological: Positive for headaches.   Hematological: Negative.    Psychiatric/Behavioral: Negative.        Objective   Physical Exam   Constitutional: She is oriented to person, place, and time. She appears well-developed and well-nourished. No distress.   HENT:   Head: Normocephalic and atraumatic.   Right Ear: Hearing, external ear and ear canal normal. Tympanic membrane is bulging.   Left Ear: Hearing, external ear and ear canal normal. Tympanic membrane is bulging.   Nose: Mucosal edema and rhinorrhea present. Right sinus exhibits frontal sinus tenderness. Right sinus exhibits no maxillary sinus tenderness. Left sinus exhibits frontal sinus tenderness. Left sinus exhibits no maxillary sinus tenderness.   Mouth/Throat: Uvula is midline and mucous membranes are normal. Posterior oropharyngeal erythema (with cobblestoning appearance noted ) present. No  tonsillar exudate.   Neck: Neck supple.   Cardiovascular: Normal rate, regular rhythm and normal heart sounds. Exam reveals no gallop and no friction rub.   No murmur heard.  Pulmonary/Chest: Effort normal and breath sounds normal. No stridor. No respiratory distress. She has no wheezes. She has no rales.   Lymphadenopathy:     She has no cervical adenopathy.   Neurological: She is alert and oriented to person, place, and time.   Skin: Skin is warm and dry. No rash noted. She is not diaphoretic. No erythema. No pallor.   Psychiatric: She has a normal mood and affect. Her behavior is normal.   Nursing note and vitals reviewed.      Assessment/Plan   Nilda was seen today for med refill.    Diagnoses and all orders for this visit:    Neuropathy  -     gabapentin (NEURONTIN) 600 MG tablet; Take 1 tablet by mouth 3 (Three) Times a Day.    Acute non-recurrent frontal sinusitis  -     MethylPREDNISolone (MEDROL, MEHDI,) 4 MG tablet; Take as directed on package instructions.    Lumbar radiculopathy    Other orders  -     brompheniramine-pseudoephedrine-DM 30-2-10 MG/5ML syrup; Take 5 mL by mouth 4 (Four) Times a Day As Needed for Allergies.  -     cefdinir (OMNICEF) 300 MG capsule; Take 1 capsule by mouth 2 (Two) Times a Day.    ny is obtained and reviewed  Refill on gabapentin given, controlled medicine contract signed and scanned in chart  Will return for follow up in 3 months, sooner if nec  Is also given omnicef, bromfed prn cough and congestion and medrol as above  Informed on how to take and potential side effects of medicine  Pt aware and is in agreement to this plan  [place] query complete. Treatment plan to include limited course of prescribed controlled substance. Risks including addiction, benefits, and alternatives presented to patient.   All questions and concerns are addressed with understanding noted.   Urgent care records reviewed  Previous labs reviewed as well

## 2019-01-14 ENCOUNTER — OFFICE VISIT (OUTPATIENT)
Dept: PODIATRY | Facility: CLINIC | Age: 39
End: 2019-01-14

## 2019-01-14 VITALS — OXYGEN SATURATION: 98 % | HEART RATE: 104 BPM | WEIGHT: 200 LBS | HEIGHT: 66 IN | BODY MASS INDEX: 32.14 KG/M2

## 2019-01-14 DIAGNOSIS — M67.49 GANGLION AND CYST OF SYNOVIUM, TENDON AND BURSA: ICD-10-CM

## 2019-01-14 DIAGNOSIS — M71.39 GANGLION AND CYST OF SYNOVIUM, TENDON AND BURSA: ICD-10-CM

## 2019-01-14 DIAGNOSIS — M67.89 GANGLION AND CYST OF SYNOVIUM, TENDON AND BURSA: ICD-10-CM

## 2019-01-14 DIAGNOSIS — M25.572 LEFT ANKLE PAIN, UNSPECIFIED CHRONICITY: ICD-10-CM

## 2019-01-14 DIAGNOSIS — M79.89 SOFT TISSUE MASS: Primary | ICD-10-CM

## 2019-01-14 PROCEDURE — 20605 DRAIN/INJ JOINT/BURSA W/O US: CPT | Performed by: PODIATRIST

## 2019-01-14 RX ADMIN — BETAMETHASONE SODIUM PHOSPHATE AND BETAMETHASONE ACETATE 6 MG: 3; 3 INJECTION, SUSPENSION INTRA-ARTICULAR; INTRALESIONAL; INTRAMUSCULAR; SOFT TISSUE at 11:10

## 2019-01-14 NOTE — PROGRESS NOTES
Nilda Jerez  1980  38 y.o. female       Patient presents today for follow up of left ankle.       Chief Complaint   Patient presents with   • Left Ankle - Follow-up       History of Present Illness    Nilda Jerez is a 38 y.o. female who presents for follow-up of excision left ankle mass.  Date of surgery 2018.  Incision site completely healed, previous pain with ankle ROM resolved, however patient notes increasing tenderness and burning over the surgical site.     Past Medical History:   Diagnosis Date   • Acute serous otitis media    • Acute sinusitis    • Asthma    • Chronic pain    • Degeneration of lumbar intervertebral disc    • Encounter for gynecological examination    • Encounter for long-term (current) use of medications     other   • Ganglion cyst     Right wrist      • H/O infectious mononucleosis    • Ingrown toenail    • Low back pain    • Lumbar radiculopathy     Left   • Menometrorrhagia    • Need for prophylactic vaccination or inoculation against diphtheria and tetanus     Need for prophylactic vaccination and inoculation against Diptheria-tetanus-pertussis, combined [DTP] [DTaP]   • Neuropathy    • Pain in lower limb    • Patient currently pregnant     G 1, P 1    • PONV (postoperative nausea and vomiting)    • Routine history and physical examination of adult     Administrative   • Smoker    • Spinal stenosis of lumbar region    • Tenosynovitis     Tendinitis AND/OR tenosynovitis of the pelvic region      • Tibialis tendinitis    • Tobacco dependence syndrome    • Upper respiratory infection          Past Surgical History:   Procedure Laterality Date   • ANAL FISSURECTOMY      Removal of anal fissure (1)      •  SECTION     • EXCISION FOOT TUMOR Left 2018    Procedure: EXCISION LEFT ANKLE GANGLION      (DR. UGO SCHAEFER) [id number];  Surgeon: Ismael Cuevas DPM;  Location: [facility];  Service: Podiatry   • EXCISION LESION Right 2018     Procedure: EXCISION LIPOMA RIGHT LOWER FLANK [id number];  Surgeon: Jose Guadalupe Trevino MD;  Location: [facility];  Service: General   • HYSTEROSCOPY  03/07/2012    Hysteroscope procedure (1)      • INJECTION OF MEDICATION  11/21/2013    Kenalog (2)      • PAP SMEAR  10/02/2014   • VAGINAL DELIVERY  2003         Family History   Problem Relation Age of Onset   • Breast cancer Mother    • Diabetes Mother    • Hypertension Mother    • Diabetes Father    • Hypertension Father    • Crohn's disease Father    • Crohn's disease Sister          Social History     Socioeconomic History   • Marital status:      Spouse name: Not on file   • Number of children: Not on file   • Years of education: Not on file   • Highest education level: Not on file   Social Needs   • Financial resource strain: Not on file   • Food insecurity - worry: Not on file   • Food insecurity - inability: Not on file   • Transportation needs - medical: Not on file   • Transportation needs - non-medical: Not on file   Occupational History   • Not on file   Tobacco Use   • Smoking status: Former Smoker     Packs/day: 1.00     Years: 10.00     Pack years: 10.00   • Smokeless tobacco: Never Used   Substance and Sexual Activity   • Alcohol use: Yes     Comment: Occasional   • Drug use: No   • Sexual activity: Defer     Partners: Male   Other Topics Concern   • Not on file   Social History Narrative   • Not on file         Current Outpatient Medications   Medication Sig Dispense Refill   • gabapentin (NEURONTIN) 600 MG tablet Take 1 tablet by mouth 3 (Three) Times a Day. 90 tablet 2   • albuterol (PROVENTIL HFA;VENTOLIN HFA) 108 (90 BASE) MCG/ACT inhaler Inhale 2 puffs Every 4 (Four) Hours As Needed for wheezing.     • brompheniramine-pseudoephedrine-DM 30-2-10 MG/5ML syrup Take 5 mL by mouth 4 (Four) Times a Day As Needed for Allergies. 118 mL 0   • cefdinir (OMNICEF) 300 MG capsule Take 1 capsule by mouth 2 (Two) Times a Day. 20 capsule 0   •  "ibuprofen (ADVIL,MOTRIN) 800 MG tablet Take 1 tablet by mouth 3 (Three) Times a Day. 90 tablet 0   • MethylPREDNISolone (MEDROL, MEHDI,) 4 MG tablet Take as directed on package instructions. 1 each 0     Current Facility-Administered Medications   Medication Dose Route Frequency Provider Last Rate Last Dose   • betamethasone acetate-betamethasone sodium phosphate (CELESTONE SOLUSPAN) injection 6 mg  6 mg Intramuscular Once Ismael Cuevas DPM         Facility-Administered Medications Ordered in Other Visits   Medication Dose Route Frequency Provider Last Rate Last Dose   • electrolyte-148 (PLASMALYTE) solution 1,000 mL  1,000 mL Intravenous Continuous Seth Phipps MD             OBJECTIVE    Pulse 104   Ht 167.6 cm (66\")   Wt 90.7 kg (200 lb)   SpO2 98%   BMI 32.28 kg/m²       Review of Systems   Constitutional: Negative.    HENT: Negative.    Eyes: Negative.    Respiratory: Negative.    Cardiovascular: Negative.    Gastrointestinal: Negative.    Endocrine: Negative.    Genitourinary: Negative.    Musculoskeletal: Positive for back pain.        Joint pain   Skin: Negative.    Allergic/Immunologic: Negative.    Neurological: Negative.    Hematological: Negative.    Psychiatric/Behavioral: The patient is nervous/anxious.          Physical Exam   Constitutional: he appears well-developed and well-nourished.   HEENT: Normocephalic. Atraumatic.  CV: No CP. RRR  Resp: Non-labored respirations.  Psychiatric: he has a normal mood and affect. his behavior is normal.         Lower Extremity Exam:  Vascular: DP/PT pulses palpable 2+.   Minimal left ankle edema  Foot warm  Negative Epifanio  Neuro: Protective sensation intact, b/l.  Light touch sensation intact, b/l  DTRs intact  Integument: No open wounds or lesions.  No erythema, scaling  No masses  Left ankle incision well healed. Small area of bulbous subcutaneous soft tissue centrally. +TTP  Musculoskeletal:  B/LE muscle strength 5/5.   Achilles, TA, TP, Peroneal " tendons intact  Ankle ROM full without pain or crepitus  No gross instability    Left ankle injection:  Risks and benefits discussed. Written consent obtained.  Skin prepped with alcohol  Left lateral ankle previous surgical site was anesthetized with 3 cc 2% lidocaine plain  Attempt of aspiration of possible ganglion recurrence with 18 ga needle yielded no fluid  Local site injection with 1 cc celestone   Band-aid applied.  Pt tolerated well.        ASSESSMENT AND PLAN    Nilda was seen today for follow-up.    Diagnoses and all orders for this visit:    Soft tissue mass  -     betamethasone acetate-betamethasone sodium phosphate (CELESTONE SOLUSPAN) injection 6 mg; Inject 1 mL into the appropriate muscle as directed by prescriber 1 (One) Time.    Left ankle pain, unspecified chronicity  -     XR Ankle 3+ View Left    Ganglion and cyst of synovium, tendon and bursa  -     betamethasone acetate-betamethasone sodium phosphate (CELESTONE SOLUSPAN) injection 6 mg; Inject 1 mL into the appropriate muscle as directed by prescriber 1 (One) Time.      -Surgical site healed  -Excessive scar tissue vs possible local recurrence of ganglion. Injection/attempted aspiration as above.  -Continue ROM exercises  -Will recheck 3 weeks, consider MRI if not improved.            This document has been electronically signed by Ismael Cuevas DPM on January 15, 2019 9:18 PM     EMR Dragon/Transcription disclaimer:   Much of this encounter note is an electronic transcription/translation of spoken language to printed text. The electronic translation of spoken language may permit erroneous, or at times, nonsensical words or phrases to be inadvertently transcribed; Although I have reviewed the note for such errors, some may still exist.    Ismael Cuevas DPM  1/15/2019  9:18 PM

## 2019-01-15 RX ORDER — BETAMETHASONE SODIUM PHOSPHATE AND BETAMETHASONE ACETATE 3; 3 MG/ML; MG/ML
6 INJECTION, SUSPENSION INTRA-ARTICULAR; INTRALESIONAL; INTRAMUSCULAR; SOFT TISSUE ONCE
Status: COMPLETED | OUTPATIENT
Start: 2019-01-15 | End: 2019-01-14

## 2019-03-14 ENCOUNTER — OFFICE VISIT (OUTPATIENT)
Dept: FAMILY MEDICINE CLINIC | Facility: CLINIC | Age: 39
End: 2019-03-14

## 2019-03-14 ENCOUNTER — LAB (OUTPATIENT)
Dept: LAB | Facility: OTHER | Age: 39
End: 2019-03-14

## 2019-03-14 VITALS
DIASTOLIC BLOOD PRESSURE: 72 MMHG | WEIGHT: 208.6 LBS | SYSTOLIC BLOOD PRESSURE: 122 MMHG | TEMPERATURE: 98.9 F | HEART RATE: 110 BPM | BODY MASS INDEX: 33.52 KG/M2 | HEIGHT: 66 IN

## 2019-03-14 DIAGNOSIS — F17.200 TOBACCO DEPENDENCE SYNDROME: Chronic | ICD-10-CM

## 2019-03-14 DIAGNOSIS — G62.9 NEUROPATHY: Primary | Chronic | ICD-10-CM

## 2019-03-14 DIAGNOSIS — M54.16 LUMBAR RADICULOPATHY: Chronic | ICD-10-CM

## 2019-03-14 DIAGNOSIS — M54.16 LUMBAR RADICULOPATHY: ICD-10-CM

## 2019-03-14 DIAGNOSIS — G62.9 NEUROPATHY: ICD-10-CM

## 2019-03-14 DIAGNOSIS — Z79.899 HIGH RISK MEDICATION USE: ICD-10-CM

## 2019-03-14 DIAGNOSIS — F17.200 TOBACCO DEPENDENCE SYNDROME: ICD-10-CM

## 2019-03-14 LAB
ALBUMIN SERPL-MCNC: 4.3 G/DL (ref 3.5–5)
ALBUMIN/GLOB SERPL: 1.4 G/DL (ref 1.1–1.8)
ALP SERPL-CCNC: 80 U/L (ref 38–126)
ALT SERPL W P-5'-P-CCNC: 21 U/L
ANION GAP SERPL CALCULATED.3IONS-SCNC: 8 MMOL/L (ref 5–15)
AST SERPL-CCNC: 21 U/L (ref 14–36)
BASOPHILS # BLD AUTO: 0.01 10*3/MM3 (ref 0–0.2)
BASOPHILS NFR BLD AUTO: 0.1 % (ref 0–2)
BILIRUB SERPL-MCNC: 0.5 MG/DL (ref 0.2–1.3)
BUN BLD-MCNC: 13 MG/DL (ref 7–17)
BUN/CREAT SERPL: 13.8 (ref 7–25)
CALCIUM SPEC-SCNC: 8.9 MG/DL (ref 8.4–10.2)
CHLORIDE SERPL-SCNC: 106 MMOL/L (ref 98–107)
CHOLEST SERPL-MCNC: 141 MG/DL (ref 150–200)
CO2 SERPL-SCNC: 27 MMOL/L (ref 22–30)
CREAT BLD-MCNC: 0.94 MG/DL (ref 0.52–1.04)
DEPRECATED RDW RBC AUTO: 38.9 FL (ref 36.4–46.3)
EOSINOPHIL # BLD AUTO: 0.19 10*3/MM3 (ref 0–0.7)
EOSINOPHIL NFR BLD AUTO: 2.2 % (ref 0–7)
ERYTHROCYTE [DISTWIDTH] IN BLOOD BY AUTOMATED COUNT: 12.3 % (ref 11.5–14.5)
GFR SERPL CREATININE-BSD FRML MDRD: 67 ML/MIN/1.73 (ref 64–149)
GLOBULIN UR ELPH-MCNC: 3 GM/DL (ref 2.3–3.5)
GLUCOSE BLD-MCNC: 97 MG/DL (ref 74–99)
HCT VFR BLD AUTO: 41.6 % (ref 35–45)
HDLC SERPL-MCNC: 48 MG/DL (ref 40–59)
HGB BLD-MCNC: 13.6 G/DL (ref 12–15.5)
LDLC SERPL CALC-MCNC: 70 MG/DL
LDLC/HDLC SERPL: 1.46 {RATIO} (ref 0–3.22)
LYMPHOCYTES # BLD AUTO: 0.99 10*3/MM3 (ref 0.6–4.2)
LYMPHOCYTES NFR BLD AUTO: 11.5 % (ref 10–50)
MCH RBC QN AUTO: 28.7 PG (ref 26.5–34)
MCHC RBC AUTO-ENTMCNC: 32.7 G/DL (ref 31.4–36)
MCV RBC AUTO: 87.8 FL (ref 80–98)
MONOCYTES # BLD AUTO: 0.77 10*3/MM3 (ref 0–0.9)
MONOCYTES NFR BLD AUTO: 8.9 % (ref 0–12)
NEUTROPHILS # BLD AUTO: 6.68 10*3/MM3 (ref 2–8.6)
NEUTROPHILS NFR BLD AUTO: 77.3 % (ref 37–80)
PLATELET # BLD AUTO: 312 10*3/MM3 (ref 150–450)
PMV BLD AUTO: 8.8 FL (ref 8–12)
POTASSIUM BLD-SCNC: 4.3 MMOL/L (ref 3.4–5)
PROT SERPL-MCNC: 7.3 G/DL (ref 6.3–8.2)
RBC # BLD AUTO: 4.74 10*6/MM3 (ref 3.77–5.16)
SODIUM BLD-SCNC: 141 MMOL/L (ref 137–145)
TRIGL SERPL-MCNC: 115 MG/DL
TSH SERPL DL<=0.05 MIU/L-ACNC: 0.75 MIU/ML (ref 0.46–4.68)
VLDLC SERPL-MCNC: 23 MG/DL
WBC NRBC COR # BLD: 8.64 10*3/MM3 (ref 3.2–9.8)

## 2019-03-14 PROCEDURE — 99214 OFFICE O/P EST MOD 30 MIN: CPT | Performed by: NURSE PRACTITIONER

## 2019-03-14 PROCEDURE — 36415 COLL VENOUS BLD VENIPUNCTURE: CPT | Performed by: NURSE PRACTITIONER

## 2019-03-14 PROCEDURE — 80307 DRUG TEST PRSMV CHEM ANLYZR: CPT | Performed by: NURSE PRACTITIONER

## 2019-03-14 PROCEDURE — G0481 DRUG TEST DEF 8-14 CLASSES: HCPCS | Performed by: NURSE PRACTITIONER

## 2019-03-14 PROCEDURE — 80171 DRUG SCREEN QUANT GABAPENTIN: CPT | Performed by: NURSE PRACTITIONER

## 2019-03-14 PROCEDURE — 80061 LIPID PANEL: CPT | Performed by: NURSE PRACTITIONER

## 2019-03-14 PROCEDURE — 84443 ASSAY THYROID STIM HORMONE: CPT | Performed by: NURSE PRACTITIONER

## 2019-03-14 PROCEDURE — 80053 COMPREHEN METABOLIC PANEL: CPT | Performed by: NURSE PRACTITIONER

## 2019-03-14 PROCEDURE — 85025 COMPLETE CBC W/AUTO DIFF WBC: CPT | Performed by: NURSE PRACTITIONER

## 2019-03-14 RX ORDER — GABAPENTIN 600 MG/1
600 TABLET ORAL 3 TIMES DAILY
Qty: 90 TABLET | Refills: 0 | Status: SHIPPED | OUTPATIENT
Start: 2019-03-14 | End: 2019-04-15 | Stop reason: SDUPTHER

## 2019-03-14 NOTE — PROGRESS NOTES
Subjective   Nilda Jerez is a 38 y.o. female. Patient here today with complaints of Med Refill  Patient here today for recheck of neuropathy and lumbar radiculopathy, on gabapentin which she has been tolerating well, taking 600 mg 3 times daily she states and is needing refills on this today.  Also relates that she takes over-the-counter Tylenol or Motrin if needed in excess of gabapentin.  Continues to smoke.  Denies side effects of medication.    Vitals:    03/14/19 0820   BP: 122/72   Pulse: 110   Temp: 98.9 °F (37.2 °C)     Past Medical History:   Diagnosis Date   • Acute serous otitis media    • Acute sinusitis    • Asthma    • Chronic pain    • Degeneration of lumbar intervertebral disc    • Encounter for gynecological examination    • Encounter for long-term (current) use of medications     other   • Ganglion cyst     Right wrist      • H/O infectious mononucleosis    • Ingrown toenail    • Low back pain    • Lumbar radiculopathy     Left   • Menometrorrhagia    • Need for prophylactic vaccination or inoculation against diphtheria and tetanus     Need for prophylactic vaccination and inoculation against Diptheria-tetanus-pertussis, combined [DTP] [DTaP]   • Neuropathy    • Pain in lower limb    • Patient currently pregnant     G 1, P 1    • PONV (postoperative nausea and vomiting)    • Routine history and physical examination of adult     Administrative   • Smoker    • Spinal stenosis of lumbar region    • Tenosynovitis     Tendinitis AND/OR tenosynovitis of the pelvic region      • Tibialis tendinitis    • Tobacco dependence syndrome    • Upper respiratory infection      Lower Extremity Issue   This is a chronic problem. The current episode started more than 1 year ago. The problem occurs constantly. The problem has been waxing and waning. Nothing aggravates the symptoms. Treatments tried: gabapentin. The treatment provided moderate relief.   Back Pain   This is a chronic problem. The current  episode started more than 1 year ago. The problem occurs constantly. The problem has been waxing and waning since onset. The pain is present in the lumbar spine. The quality of the pain is described as aching. The pain is moderate. The symptoms are aggravated by bending and position. Pertinent negatives include no bladder incontinence or bowel incontinence. Risk factors include obesity. She has tried NSAIDs, analgesics and bed rest (tylenol) for the symptoms. The treatment provided moderate relief.        The following portions of the patient's history were reviewed and updated as appropriate: allergies, current medications, past family history, past medical history, past social history, past surgical history and problem list.    Review of Systems   Constitutional: Negative.    HENT: Negative.    Eyes: Negative.    Respiratory: Negative.    Cardiovascular: Negative.    Gastrointestinal: Negative.  Negative for bowel incontinence.   Endocrine: Negative.    Genitourinary: Negative.  Negative for bladder incontinence.   Musculoskeletal: Positive for back pain.   Skin: Negative.    Allergic/Immunologic: Negative.    Neurological: Negative.    Hematological: Negative.    Psychiatric/Behavioral: Negative.        Objective   Physical Exam   Constitutional: She is oriented to person, place, and time. She appears well-developed and well-nourished. No distress.   HENT:   Head: Normocephalic and atraumatic.   Cardiovascular: Normal rate, regular rhythm and normal heart sounds. Exam reveals no gallop and no friction rub.   No murmur heard.  Pulmonary/Chest: Effort normal and breath sounds normal. No stridor. No respiratory distress. She has no wheezes. She has no rales.   Musculoskeletal: She exhibits tenderness.        Lumbar back: She exhibits decreased range of motion, tenderness and bony tenderness.   Neurological: She is alert and oriented to person, place, and time. She displays normal reflexes.   Skin: Skin is warm and  dry. No rash noted. She is not diaphoretic. No erythema. No pallor.   Psychiatric: She has a normal mood and affect. Her behavior is normal.   Nursing note and vitals reviewed.      Assessment/Plan   Nilda was seen today for med refill.    Diagnoses and all orders for this visit:    Neuropathy  -     CBC & Differential; Future  -     Comprehensive metabolic panel; Future  -     Gabapentin level; Future  -     ToxASSURE Select 13 (MW) - Urine, Clean Catch; Future  -     gabapentin (NEURONTIN) 600 MG tablet; Take 1 tablet by mouth 3 (Three) Times a Day.    Lumbar radiculopathy  -     CBC & Differential; Future  -     Comprehensive metabolic panel; Future  -     Gabapentin level; Future  -     ToxASSURE Select 13 (MW) - Urine, Clean Catch; Future    Tobacco dependence syndrome  -     CBC & Differential; Future  -     Comprehensive metabolic panel; Future  -     Gabapentin level; Future  -     ToxASSURE Select 13 (MW) - Urine, Clean Catch; Future    High risk medication use  -     CBC & Differential; Future  -     Comprehensive metabolic panel; Future  -     Gabapentin level; Future  -     ToxASSURE Select 13 (MW) - Urine, Clean Catch; Future    ny is obtained and reviewed  Will obtain above labs prior to refilling pt's gabapentin medication , pt's gabapentin level previously was not detected, she reiterates today that she is taking 600mg tid every day, RX refill is only sent after labs were obtained, pt aware must have today  Will have her follow up here in 3 months, sooner if nec  Continue with tylenol/motrin prn pain   She is aware and is in agreement to this plan  All questions and concerns are addressed with understanding noted.   [place] query complete. Treatment plan to include limited course of prescribed controlled substance. Risks including addiction, benefits, and alternatives presented to patient.

## 2019-03-18 LAB — GABAPENTIN SERPLBLD-MCNC: NORMAL UG/ML (ref 4–16)

## 2019-03-19 DIAGNOSIS — Z79.899 HIGH RISK MEDICATION USE: Primary | ICD-10-CM

## 2019-03-21 LAB — CONV REPORT SUMMARY: NORMAL

## 2019-04-03 ENCOUNTER — LAB (OUTPATIENT)
Dept: LAB | Facility: OTHER | Age: 39
End: 2019-04-03

## 2019-04-03 DIAGNOSIS — Z79.899 HIGH RISK MEDICATION USE: ICD-10-CM

## 2019-04-03 PROCEDURE — 36415 COLL VENOUS BLD VENIPUNCTURE: CPT | Performed by: NURSE PRACTITIONER

## 2019-04-03 PROCEDURE — 80171 DRUG SCREEN QUANT GABAPENTIN: CPT | Performed by: NURSE PRACTITIONER

## 2019-04-08 LAB — GABAPENTIN SERPLBLD-MCNC: NORMAL UG/ML (ref 4–16)

## 2019-04-15 ENCOUNTER — TELEPHONE (OUTPATIENT)
Dept: FAMILY MEDICINE CLINIC | Facility: CLINIC | Age: 39
End: 2019-04-15

## 2019-04-15 DIAGNOSIS — G62.9 NEUROPATHY: Chronic | ICD-10-CM

## 2019-04-15 RX ORDER — GABAPENTIN 600 MG/1
600 TABLET ORAL 3 TIMES DAILY
Qty: 90 TABLET | Refills: 0 | Status: SHIPPED | OUTPATIENT
Start: 2019-04-15 | End: 2019-10-23

## 2019-11-19 ENCOUNTER — CLINICAL SUPPORT (OUTPATIENT)
Dept: FAMILY MEDICINE CLINIC | Facility: CLINIC | Age: 39
End: 2019-11-19

## 2019-11-19 PROCEDURE — 90674 CCIIV4 VAC NO PRSV 0.5 ML IM: CPT | Performed by: NURSE PRACTITIONER

## 2019-11-19 PROCEDURE — 90471 IMMUNIZATION ADMIN: CPT | Performed by: NURSE PRACTITIONER

## 2019-11-20 ENCOUNTER — OFFICE VISIT (OUTPATIENT)
Dept: FAMILY MEDICINE CLINIC | Facility: CLINIC | Age: 39
End: 2019-11-20

## 2019-11-20 ENCOUNTER — LAB (OUTPATIENT)
Dept: LAB | Facility: OTHER | Age: 39
End: 2019-11-20

## 2019-11-20 VITALS
SYSTOLIC BLOOD PRESSURE: 122 MMHG | DIASTOLIC BLOOD PRESSURE: 70 MMHG | HEIGHT: 66 IN | HEART RATE: 78 BPM | TEMPERATURE: 97.6 F | BODY MASS INDEX: 28.42 KG/M2 | WEIGHT: 176.8 LBS

## 2019-11-20 DIAGNOSIS — Z01.84 IMMUNITY STATUS TESTING: ICD-10-CM

## 2019-11-20 DIAGNOSIS — Z01.84 IMMUNITY STATUS TESTING: Primary | ICD-10-CM

## 2019-11-20 LAB
HBV SURFACE AB SER RIA-ACNC: NORMAL
HBV SURFACE AG SERPL QL IA: NORMAL

## 2019-11-20 PROCEDURE — 99213 OFFICE O/P EST LOW 20 MIN: CPT | Performed by: NURSE PRACTITIONER

## 2019-11-20 PROCEDURE — 86706 HEP B SURFACE ANTIBODY: CPT | Performed by: NURSE PRACTITIONER

## 2019-11-20 PROCEDURE — 86735 MUMPS ANTIBODY: CPT | Performed by: NURSE PRACTITIONER

## 2019-11-20 PROCEDURE — 86480 TB TEST CELL IMMUN MEASURE: CPT | Performed by: NURSE PRACTITIONER

## 2019-11-20 PROCEDURE — 86762 RUBELLA ANTIBODY: CPT | Performed by: NURSE PRACTITIONER

## 2019-11-20 PROCEDURE — 87340 HEPATITIS B SURFACE AG IA: CPT | Performed by: NURSE PRACTITIONER

## 2019-11-20 PROCEDURE — 86765 RUBEOLA ANTIBODY: CPT | Performed by: NURSE PRACTITIONER

## 2019-11-20 PROCEDURE — 36415 COLL VENOUS BLD VENIPUNCTURE: CPT

## 2019-11-20 NOTE — PROGRESS NOTES
"Subjective   Nilda Leny Jerez is a 39 y.o. female. 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 Denies complaints today. Had flu shot yesterday. She continues to smoke.     Vitals:    11/20/19 0807   BP: 122/70   Pulse: 78   Temp: 97.6 °F (36.4 °C)   Weight: 80.2 kg (176 lb 12.8 oz)   Height: 167.6 cm (66\")   PainSc: 0-No pain     Body mass index is 28.54 kg/m².  Past Medical History:   Diagnosis Date   • Acute serous otitis media    • Acute sinusitis    • Asthma    • Chronic pain    • Degeneration of lumbar intervertebral disc    • Encounter for gynecological examination    • Encounter for long-term (current) use of medications     other   • Ganglion cyst     Right wrist      • H/O infectious mononucleosis    • Ingrown toenail    • Low back pain    • Lumbar radiculopathy     Left   • Menometrorrhagia    • Need for prophylactic vaccination or inoculation against diphtheria and tetanus     Need for prophylactic vaccination and inoculation against Diptheria-tetanus-pertussis, combined [DTP] [DTaP]   • Neuropathy    • Pain in lower limb    • Patient currently pregnant     G 1, P 1    • PONV (postoperative nausea and vomiting)    • Routine history and physical examination of adult     Administrative   • Smoker    • Spinal stenosis of lumbar region    • Tenosynovitis     Tendinitis AND/OR tenosynovitis of the pelvic region      • Tibialis tendinitis    • Tobacco dependence syndrome    • Upper respiratory infection      History of Present Illness     The following portions of the patient's history were reviewed and updated as appropriate: allergies, current medications, past family history, past medical history, past social history, past surgical history and problem list.    Review of Systems   Constitutional: Negative.    HENT: Negative.    Eyes: Negative.    Respiratory: Negative.  "   Cardiovascular: Negative.    Gastrointestinal: Negative.    Endocrine: Negative.    Genitourinary: Negative.    Musculoskeletal: Negative.    Skin: Negative.    Allergic/Immunologic: Negative.    Neurological: Negative.    Hematological: Negative.    Psychiatric/Behavioral: Negative.        Objective   Physical Exam   Constitutional: She is oriented to person, place, and time. She appears well-developed and well-nourished. No distress.   HENT:   Head: Normocephalic and atraumatic.   Cardiovascular: Normal rate, regular rhythm and normal heart sounds. Exam reveals no gallop and no friction rub.   No murmur heard.  Pulmonary/Chest: Effort normal and breath sounds normal. No stridor. No respiratory distress. She has no wheezes. She has no rales.   Neurological: She is alert and oriented to person, place, and time.   Skin: Skin is warm and dry. No rash noted. She is not diaphoretic. No erythema. No pallor.   Psychiatric: She has a normal mood and affect. Her behavior is normal.   Nursing note and vitals reviewed.      Assessment/Plan   Nilda was seen today for follow-up.    Diagnoses and all orders for this visit:    Immunity status testing  -     Measles / Mumps / Rubella Immunity; Future  -     Hepatitis B Surface Antibody; Future  -     Hepatitis B Surface Antigen; Future  -     QuantiFERON TB Gold; Future    labs will be obtained as above, will inform of results  Had flu shot yesterday  Will inform of immunity when results are back  Follow up here prn problems  She is aware and is in agreement to this plan  All questions and concerns are addressed with understanding noted.

## 2019-11-22 LAB
MEV IGG SER IA-ACNC: 114 AU/ML
MUV IGG SER IA-ACNC: 54.2 AU/ML
RUBV IGG SERPL IA-ACNC: 5.08 INDEX

## 2019-11-23 LAB
QUANTIFERON CRITERIA: NORMAL
QUANTIFERON MITOGEN VALUE: >10 IU/ML
QUANTIFERON NIL VALUE: 0.06 IU/ML
QUANTIFERON TB1 AG VALUE: 0.07 IU/ML
QUANTIFERON TB2 AG VALUE: 0.06 IU/ML
QUANTIFERON-TB GOLD PLUS: NEGATIVE

## 2020-01-23 ENCOUNTER — OFFICE VISIT (OUTPATIENT)
Dept: FAMILY MEDICINE CLINIC | Facility: CLINIC | Age: 40
End: 2020-01-23

## 2020-01-23 VITALS
HEIGHT: 66 IN | HEART RATE: 73 BPM | TEMPERATURE: 97.1 F | SYSTOLIC BLOOD PRESSURE: 142 MMHG | WEIGHT: 178 LBS | BODY MASS INDEX: 28.61 KG/M2 | DIASTOLIC BLOOD PRESSURE: 98 MMHG

## 2020-01-23 DIAGNOSIS — M25.511 ACUTE PAIN OF RIGHT SHOULDER: Primary | ICD-10-CM

## 2020-01-23 DIAGNOSIS — S46.011A ROTATOR CUFF STRAIN, RIGHT, INITIAL ENCOUNTER: ICD-10-CM

## 2020-01-23 PROCEDURE — 99213 OFFICE O/P EST LOW 20 MIN: CPT | Performed by: NURSE PRACTITIONER

## 2020-01-23 RX ORDER — METHYLPREDNISOLONE 4 MG/1
TABLET ORAL
Qty: 1 EACH | Refills: 0 | Status: SHIPPED | OUTPATIENT
Start: 2020-01-23 | End: 2022-03-23

## 2020-01-23 RX ORDER — IBUPROFEN 800 MG/1
800 TABLET ORAL EVERY 6 HOURS PRN
Qty: 60 TABLET | Refills: 0 | Status: SHIPPED | OUTPATIENT
Start: 2020-01-23 | End: 2022-03-23

## 2020-01-23 NOTE — PROGRESS NOTES
"Subjective   Nilda Leny Jerez is a 39 y.o. female. Patient here today with complaints of Shoulder Pain (right)  Patient here today with reports of right shoulder pain x3 weeks, denies injury, she is right-handed, has used ibuprofen and naproxen with \"very little help \"or relief of pain.  Denies neck pain, is active at home and at work but denies any known injury.  Rates pain as 6/10 on pain scale today.    Vitals:    01/23/20 0901   BP: 142/98   Pulse: 73   Temp: 97.1 °F (36.2 °C)   Weight: 80.7 kg (178 lb)   Height: 167.6 cm (66\")   PainSc:   6   PainLoc: Shoulder     Body mass index is 28.73 kg/m².  Past Medical History:   Diagnosis Date   • Acute serous otitis media    • Acute sinusitis    • Asthma    • Chronic pain    • Degeneration of lumbar intervertebral disc    • Encounter for gynecological examination    • Encounter for long-term (current) use of medications     other   • Ganglion cyst     Right wrist      • H/O infectious mononucleosis    • Ingrown toenail    • Low back pain    • Lumbar radiculopathy     Left   • Menometrorrhagia    • Need for prophylactic vaccination or inoculation against diphtheria and tetanus     Need for prophylactic vaccination and inoculation against Diptheria-tetanus-pertussis, combined [DTP] [DTaP]   • Neuropathy    • Pain in lower limb    • Patient currently pregnant     G 1, P 1    • PONV (postoperative nausea and vomiting)    • Routine history and physical examination of adult     Administrative   • Smoker    • Spinal stenosis of lumbar region    • Tenosynovitis     Tendinitis AND/OR tenosynovitis of the pelvic region      • Tibialis tendinitis    • Tobacco dependence syndrome    • Upper respiratory infection      Shoulder Injury    The right shoulder is affected. The incident occurred more than 1 week ago. The injury mechanism is unknown. The quality of the pain is described as aching. The pain does not radiate. The pain is at a severity of 6/10. The pain is moderate. " Associated symptoms include muscle weakness. Pertinent negatives include no chest pain, numbness or tingling. The symptoms are aggravated by movement, palpation and overhead lifting. She has tried rest, non-weight bearing and NSAIDs for the symptoms. The treatment provided mild relief.        The following portions of the patient's history were reviewed and updated as appropriate: allergies, current medications, past family history, past medical history, past social history, past surgical history and problem list.    Review of Systems   Constitutional: Negative.    HENT: Negative.    Eyes: Negative.    Respiratory: Negative.    Cardiovascular: Negative.  Negative for chest pain.   Gastrointestinal: Negative.    Endocrine: Negative.    Genitourinary: Negative.    Musculoskeletal: Positive for arthralgias.   Skin: Negative.    Allergic/Immunologic: Negative.    Neurological: Negative.  Negative for tingling and numbness.   Hematological: Negative.    Psychiatric/Behavioral: Negative.        Objective   Physical Exam   Constitutional: She is oriented to person, place, and time. She appears well-developed and well-nourished. No distress.   HENT:   Head: Normocephalic and atraumatic.   Cardiovascular: Normal rate, regular rhythm and normal heart sounds. Exam reveals no gallop and no friction rub.   No murmur heard.  Pulmonary/Chest: Effort normal and breath sounds normal. No stridor. No respiratory distress. She has no wheezes. She has no rales.   Musculoskeletal: She exhibits tenderness.        Right shoulder: She exhibits decreased range of motion, tenderness, bony tenderness and pain. She exhibits normal pulse and normal strength.        Cervical back: Normal.   She has pain with internal rotation, she has pain with palpation of anterior and lateral right shoulder, negative empty cup test on right, does have increased pain with resistance of abduction but has full range of motion, does complain of pain with full range  of motion at greater than 145 degrees   Neurological: She is alert and oriented to person, place, and time.   Skin: Skin is warm and dry. She is not diaphoretic.   Psychiatric: She has a normal mood and affect. Her behavior is normal.   Nursing note and vitals reviewed.      Assessment/Plan   Nilda was seen today for shoulder pain.    Diagnoses and all orders for this visit:    Acute pain of right shoulder  -     XR Shoulder 2+ View Right    Rotator cuff strain, right, initial encounter    Other orders  -     methylPREDNISolone (MEDROL, MEHDI,) 4 MG tablet; Take as directed on package instructions.  -     ibuprofen (ADVIL,MOTRIN) 800 MG tablet; Take 1 tablet by mouth Every 6 (Six) Hours As Needed for Mild Pain .    I will x-ray right shoulder, give her Medrol Dosepak and anti-inflammatories as above, advised that she use ice to area and rest right upper extremity and if symptoms do persist or worsen in the next few weeks she will call back or come back for consideration of referral to orthopedic/MRI/PT. patient aware and in agreement to this plan.  All questions and concerns are addressed with understanding noted.

## 2020-01-27 DIAGNOSIS — R93.89 ABNORMAL X-RAY: Primary | ICD-10-CM

## 2020-01-28 DIAGNOSIS — R91.1 PULMONARY NODULE: Primary | ICD-10-CM

## 2020-06-18 ENCOUNTER — E-VISIT (OUTPATIENT)
Dept: FAMILY MEDICINE CLINIC | Facility: CLINIC | Age: 40
End: 2020-06-18

## 2020-06-18 DIAGNOSIS — R05.9 COUGH: Primary | ICD-10-CM

## 2020-06-18 PROCEDURE — 99422 OL DIG E/M SVC 11-20 MIN: CPT | Performed by: NURSE PRACTITIONER

## 2020-06-18 RX ORDER — DOXYCYCLINE HYCLATE 100 MG/1
100 CAPSULE ORAL 2 TIMES DAILY
Qty: 14 CAPSULE | Refills: 0 | Status: SHIPPED | OUTPATIENT
Start: 2020-06-18 | End: 2020-06-25

## 2020-06-18 NOTE — PROGRESS NOTES
Pt contacted office today for Evisit, complaining of cough, chest congestion x 2 weeks or longer, reports has been using albuterol inhaler and OTC cough medicine without symptom relief. States she has not had sick contacts. Denies fever. Reports does not want covid testing although offered and ordered for her to have, she was advised to quarantine self until covid testing back but she declines. Will treat with doxycyline 100mg bid x 7d. If symptoms persist or worsen she is asked to RTC for recheck. Pt aware and is in agreement to this plan. CF

## 2022-03-23 ENCOUNTER — OFFICE VISIT (OUTPATIENT)
Dept: FAMILY MEDICINE CLINIC | Facility: CLINIC | Age: 42
End: 2022-03-23

## 2022-03-23 ENCOUNTER — LAB (OUTPATIENT)
Dept: LAB | Facility: OTHER | Age: 42
End: 2022-03-23

## 2022-03-23 VITALS
SYSTOLIC BLOOD PRESSURE: 130 MMHG | HEIGHT: 66 IN | WEIGHT: 198 LBS | HEART RATE: 98 BPM | DIASTOLIC BLOOD PRESSURE: 80 MMHG | BODY MASS INDEX: 31.82 KG/M2 | OXYGEN SATURATION: 100 %

## 2022-03-23 DIAGNOSIS — R11.0 NAUSEA: ICD-10-CM

## 2022-03-23 DIAGNOSIS — R19.7 DIARRHEA, UNSPECIFIED TYPE: ICD-10-CM

## 2022-03-23 DIAGNOSIS — R10.13 EPIGASTRIC PAIN: ICD-10-CM

## 2022-03-23 DIAGNOSIS — R10.13 EPIGASTRIC PAIN: Primary | ICD-10-CM

## 2022-03-23 DIAGNOSIS — K21.9 GASTROESOPHAGEAL REFLUX DISEASE, UNSPECIFIED WHETHER ESOPHAGITIS PRESENT: ICD-10-CM

## 2022-03-23 DIAGNOSIS — R05.9 COUGH: ICD-10-CM

## 2022-03-23 LAB
ALBUMIN SERPL-MCNC: 4.1 G/DL (ref 3.5–5)
ALBUMIN/GLOB SERPL: 1.3 G/DL (ref 1.1–1.8)
ALP SERPL-CCNC: 79 U/L (ref 38–126)
ALT SERPL W P-5'-P-CCNC: 17 U/L
AMYLASE SERPL-CCNC: 67 U/L (ref 30–110)
ANION GAP SERPL CALCULATED.3IONS-SCNC: 6 MMOL/L (ref 5–15)
AST SERPL-CCNC: 25 U/L (ref 14–36)
BASOPHILS # BLD AUTO: 0.03 10*3/MM3 (ref 0–0.2)
BASOPHILS NFR BLD AUTO: 0.3 % (ref 0–1.5)
BILIRUB SERPL-MCNC: 0.3 MG/DL (ref 0.2–1.3)
BUN SERPL-MCNC: 17 MG/DL (ref 7–23)
BUN/CREAT SERPL: 21 (ref 7–25)
CALCIUM SPEC-SCNC: 9.2 MG/DL (ref 8.4–10.2)
CHLORIDE SERPL-SCNC: 103 MMOL/L (ref 101–112)
CO2 SERPL-SCNC: 29 MMOL/L (ref 22–30)
CREAT SERPL-MCNC: 0.81 MG/DL (ref 0.52–1.04)
DEPRECATED RDW RBC AUTO: 46.5 FL (ref 37–54)
EGFRCR SERPLBLD CKD-EPI 2021: 94.2 ML/MIN/1.73
EOSINOPHIL # BLD AUTO: 0.15 10*3/MM3 (ref 0–0.4)
EOSINOPHIL NFR BLD AUTO: 1.3 % (ref 0.3–6.2)
ERYTHROCYTE [DISTWIDTH] IN BLOOD BY AUTOMATED COUNT: 14.6 % (ref 12.3–15.4)
GLOBULIN UR ELPH-MCNC: 3.2 GM/DL (ref 2.3–3.5)
GLUCOSE SERPL-MCNC: 96 MG/DL (ref 70–99)
HCT VFR BLD AUTO: 39.9 % (ref 34–46.6)
HGB BLD-MCNC: 13.4 G/DL (ref 12–15.9)
LYMPHOCYTES # BLD AUTO: 3.07 10*3/MM3 (ref 0.7–3.1)
LYMPHOCYTES NFR BLD AUTO: 26.8 % (ref 19.6–45.3)
MCH RBC QN AUTO: 30 PG (ref 26.6–33)
MCHC RBC AUTO-ENTMCNC: 33.6 G/DL (ref 31.5–35.7)
MCV RBC AUTO: 89.3 FL (ref 79–97)
MONOCYTES # BLD AUTO: 1.04 10*3/MM3 (ref 0.1–0.9)
MONOCYTES NFR BLD AUTO: 9.1 % (ref 5–12)
NEUTROPHILS NFR BLD AUTO: 62.5 % (ref 42.7–76)
NEUTROPHILS NFR BLD AUTO: 7.17 10*3/MM3 (ref 1.7–7)
PLATELET # BLD AUTO: 351 10*3/MM3 (ref 140–450)
PMV BLD AUTO: 9.1 FL (ref 6–12)
POTASSIUM SERPL-SCNC: 4.2 MMOL/L (ref 3.4–5)
PROT SERPL-MCNC: 7.3 G/DL (ref 6.3–8.6)
RBC # BLD AUTO: 4.47 10*6/MM3 (ref 3.77–5.28)
SODIUM SERPL-SCNC: 138 MMOL/L (ref 137–145)
WBC NRBC COR # BLD: 11.46 10*3/MM3 (ref 3.4–10.8)

## 2022-03-23 PROCEDURE — 82150 ASSAY OF AMYLASE: CPT | Performed by: NURSE PRACTITIONER

## 2022-03-23 PROCEDURE — 86677 HELICOBACTER PYLORI ANTIBODY: CPT | Performed by: NURSE PRACTITIONER

## 2022-03-23 PROCEDURE — 36415 COLL VENOUS BLD VENIPUNCTURE: CPT | Performed by: NURSE PRACTITIONER

## 2022-03-23 PROCEDURE — 83690 ASSAY OF LIPASE: CPT | Performed by: NURSE PRACTITIONER

## 2022-03-23 PROCEDURE — 85025 COMPLETE CBC W/AUTO DIFF WBC: CPT | Performed by: NURSE PRACTITIONER

## 2022-03-23 PROCEDURE — 99214 OFFICE O/P EST MOD 30 MIN: CPT | Performed by: NURSE PRACTITIONER

## 2022-03-23 PROCEDURE — 80053 COMPREHEN METABOLIC PANEL: CPT | Performed by: NURSE PRACTITIONER

## 2022-03-23 RX ORDER — PANTOPRAZOLE SODIUM 40 MG/1
40 TABLET, DELAYED RELEASE ORAL DAILY
Qty: 30 TABLET | Refills: 2 | Status: SHIPPED | OUTPATIENT
Start: 2022-03-23 | End: 2022-07-19

## 2022-03-23 RX ORDER — ALBUTEROL SULFATE 90 UG/1
2 AEROSOL, METERED RESPIRATORY (INHALATION) EVERY 6 HOURS PRN
Qty: 18 G | Refills: 0 | Status: SHIPPED | OUTPATIENT
Start: 2022-03-23

## 2022-03-23 NOTE — PROGRESS NOTES
"Chief Complaint  Abdominal Pain (Thinks she is having gallbladder issues/) and Med Refill    Subjective          Nilda Jerez presents to [facility]  Abdominal Pain    Pt presents with complaints of epigastric pain that began 2-3 weeks ago. Pain is accompanied by intermittent nausea, heartburn, and diarrhea. The pain is described as constant and dull and does not radiate. Pain is exacerbated by eating but pt is unable to identify specific aggravating foods. Denies significant weight loss. Denies fever.     Objective   Vital Signs:   /80   Pulse 98   Ht 167.6 cm (66\")   Wt 89.8 kg (198 lb)   SpO2 100%   BMI 31.96 kg/m²     BMI is above normal parameters. Recommendations: exercise counseling/recommendations and nutrition counseling/recommendations       Physical Exam  Vitals and nursing note reviewed.   Constitutional:       General: She is not in acute distress.     Appearance: Normal appearance. She is obese. She is not ill-appearing, toxic-appearing or diaphoretic.   HENT:      Head: Normocephalic and atraumatic.   Cardiovascular:      Rate and Rhythm: Normal rate and regular rhythm.      Heart sounds: Normal heart sounds. No murmur heard.    No friction rub. No gallop.   Pulmonary:      Effort: Pulmonary effort is normal. No respiratory distress.      Breath sounds: Normal breath sounds. No stridor. No wheezing, rhonchi or rales.   Abdominal:      General: Bowel sounds are normal. There is no distension.      Palpations: Abdomen is soft. There is no mass.      Tenderness: There is no abdominal tenderness. There is no guarding or rebound.   Skin:     General: Skin is warm and dry.      Coloration: Skin is not jaundiced or pale.      Findings: No bruising, erythema, lesion or rash.   Neurological:      General: No focal deficit present.      Mental Status: She is alert and oriented to person, place, and time. Mental status is at baseline.      " Cranial Nerves: No cranial nerve deficit.      Motor: No weakness.      Coordination: Coordination normal.      Gait: Gait normal.   Psychiatric:         Mood and Affect: Mood normal.         Behavior: Behavior normal.         Thought Content: Thought content normal.         Judgment: Judgment normal.        Result Review :     Common labs    Common Labsle 3/23/22 3/23/22    1527 1527   Glucose  96   BUN  17   Creatinine  0.81   Sodium  138   Potassium  4.2   Chloride  103   Calcium  9.2   Albumin  4.10   Total Bilirubin  0.3   Alkaline Phosphatase  79   AST (SGOT)  25   ALT (SGPT)  17   WBC 11.46 (A)    Hemoglobin 13.4    Hematocrit 39.9    Platelets 351    (A) Abnormal value            CMP    CMP 3/23/22   Glucose 96   BUN 17   Creatinine 0.81   Sodium 138   Potassium 4.2   Chloride 103   Calcium 9.2   Albumin 4.10   Total Bilirubin 0.3   Alkaline Phosphatase 79   AST (SGOT) 25   ALT (SGPT) 17           CBC    CBC 3/23/22   WBC 11.46 (A)   RBC 4.47   Hemoglobin 13.4   Hematocrit 39.9   MCV 89.3   MCH 30.0   MCHC 33.6   RDW 14.6   Platelets 351   (A) Abnormal value            CBC w/diff    CBC w/Diff 3/23/22   WBC 11.46 (A)   RBC 4.47   Hemoglobin 13.4   Hematocrit 39.9   MCV 89.3   MCH 30.0   MCHC 33.6   RDW 14.6   Platelets 351   Neutrophil Rel % 62.5   Lymphocyte Rel % 26.8   Monocyte Rel % 9.1   Eosinophil Rel % 1.3   Basophil Rel % 0.3   (A) Abnormal value                      Assessment and Plan    Diagnoses and all orders for this visit:    1. Epigastric pain (Primary)  -     CBC & Differential; Future  -     Comprehensive metabolic panel; Future  -     Helicobacter Pylori, IgA IgG IgM; Future  -     Amylase; Future  -     Lipase; Future  -     US Gallbladder  -     pantoprazole (PROTONIX) 40 MG EC tablet; Take 1 tablet by mouth Daily.  Dispense: 30 tablet; Refill: 2    2. Cough  -     albuterol sulfate  (90 Base) MCG/ACT inhaler; Inhale 2 puffs Every 6 (Six) Hours As Needed for Wheezing or Shortness  of Air.  Dispense: 18 g; Refill: 0    3. Diarrhea, unspecified type  -     CBC & Differential; Future  -     Comprehensive metabolic panel; Future  -     Helicobacter Pylori, IgA IgG IgM; Future  -     Amylase; Future  -     Lipase; Future  -     US Gallbladder  -     pantoprazole (PROTONIX) 40 MG EC tablet; Take 1 tablet by mouth Daily.  Dispense: 30 tablet; Refill: 2    4. Nausea  -     CBC & Differential; Future  -     Comprehensive metabolic panel; Future  -     Helicobacter Pylori, IgA IgG IgM; Future  -     Amylase; Future  -     Lipase; Future  -     US Gallbladder  -     pantoprazole (PROTONIX) 40 MG EC tablet; Take 1 tablet by mouth Daily.  Dispense: 30 tablet; Refill: 2    5. Gastroesophageal reflux disease, unspecified whether esophagitis present  -     pantoprazole (PROTONIX) 40 MG EC tablet; Take 1 tablet by mouth Daily.  Dispense: 30 tablet; Refill: 2    Will obtain labs gallbladder ultrasound as above and start her on Protonix, advised decreasing fatty food intake.  We will follow-up with her after above are completed for results.  May need to consider referral on to gastroenterology versus surgery depending on test results and continued symptoms.  She is also given refill on albuterol inhaler today to use as needed.  If complaints worsen prior to scheduled follow-up and she will return sooner.  All questions and concerns are addressed with understanding verbalized.  She is aware and in agreement to this plan.      I spent 33 minutes caring for Nilda on this date of service. This time includes time spent by me in the following activities:preparing for the visit, reviewing tests, performing a medically appropriate examination and/or evaluation , counseling and educating the patient/family/caregiver, ordering medications, tests, or procedures and documenting information in the medical record  Follow Up   Return in about 4 weeks (around 4/20/2022) for or sooner as needed.  Patient was given instructions  and counseling regarding her condition or for health maintenance advice. Please see specific information pulled into the AVS if appropriate.

## 2022-03-24 LAB — LIPASE SERPL-CCNC: 57 U/L (ref 13–60)

## 2022-03-25 LAB
H PYLORI IGA SER-ACNC: <9 UNITS (ref 0–8.9)
H PYLORI IGG SER IA-ACNC: 0.3 INDEX VALUE (ref 0–0.79)
H PYLORI IGM SER-ACNC: <9 UNITS (ref 0–8.9)

## 2022-03-31 DIAGNOSIS — R10.13 EPIGASTRIC PAIN: Primary | ICD-10-CM

## 2022-03-31 DIAGNOSIS — R19.7 DIARRHEA, UNSPECIFIED TYPE: ICD-10-CM

## 2022-04-25 ENCOUNTER — OFFICE VISIT (OUTPATIENT)
Dept: FAMILY MEDICINE CLINIC | Facility: CLINIC | Age: 42
End: 2022-04-25

## 2022-04-25 VITALS
SYSTOLIC BLOOD PRESSURE: 124 MMHG | WEIGHT: 200 LBS | HEIGHT: 66 IN | OXYGEN SATURATION: 100 % | DIASTOLIC BLOOD PRESSURE: 80 MMHG | BODY MASS INDEX: 32.14 KG/M2 | HEART RATE: 86 BPM

## 2022-04-25 DIAGNOSIS — M25.572 ACUTE LEFT ANKLE PAIN: Primary | ICD-10-CM

## 2022-04-25 DIAGNOSIS — Z98.890 HISTORY OF ANKLE SURGERY: ICD-10-CM

## 2022-04-25 PROCEDURE — 99213 OFFICE O/P EST LOW 20 MIN: CPT | Performed by: NURSE PRACTITIONER

## 2022-04-25 RX ORDER — IBUPROFEN 800 MG/1
800 TABLET ORAL EVERY 6 HOURS PRN
Qty: 90 TABLET | Refills: 0 | Status: SHIPPED | OUTPATIENT
Start: 2022-04-25 | End: 2022-07-19

## 2022-04-25 NOTE — PROGRESS NOTES
"Chief Complaint  Abdominal Pain (1 month follow up on gallbladder/) and Ankle Pain (Left ankle pain/)    Subjective          Nilda Jerez presents to [facility] [place]  History of Present Illness    The patient  presents for 1 month follow-up regarding gastroesophageal reflux disease and abdominal pain; however, this has resolved. She has complaints of ankle pain today.    She reports improvement in regards to her abdominal pain.     The patient complains of significant ankle pain affecting her left ankle for 2 weeks. She adds she had surgery on her left ankle 4 to 5 years ago according to pt. The patient describes her pain as sharp and shooting in nature. She localizes her pain to the lateral aspect of the ankle, to the inner ankle, and \"on the top part\"; this area is the same area she previously had surgery. The patient reports ambulation exacerbates her pain. She reports pain with dorsiflexion; however, no pain with plantar flexion. She notes she is minimally able to perform inversion and eversion without pain. The patient reports taking Tylenol, ibuprofen, and naproxen on separate occasions with no relief in pain. She reports she obtained a Copper Fit ankle brace without relief as well.    Objective   Vital Signs:   /80   Pulse 86   Ht 167.6 cm (66\")   Wt 90.7 kg (200 lb)   SpO2 100%   BMI 32.28 kg/m²     BMI is above normal parameters. Recommendations: exercise counseling/recommendations and nutrition counseling/recommendations       Physical Exam  Vitals and nursing note reviewed.   Constitutional:       General: She is not in acute distress.     Appearance: Normal appearance. She is not ill-appearing, toxic-appearing or diaphoretic.      Comments: She is wearing supportive shoes.    HENT:      Head: Normocephalic and atraumatic.   Cardiovascular:      Rate and Rhythm: Normal rate and regular rhythm.      Pulses: Normal pulses.      Heart " sounds: Normal heart sounds. No murmur heard.    No friction rub. No gallop.      Comments: DP and PT pulses are palpable.   Pulmonary:      Effort: Pulmonary effort is normal. No respiratory distress.   Musculoskeletal:         General: Tenderness present.      Left ankle: Tenderness present over the lateral malleolus and medial malleolus. No base of 5th metatarsal tenderness. Decreased range of motion. Normal pulse.      Left Achilles Tendon: Normal.      Comments: She has pain with flexion, can only flex to approximately 90 degrees, lesser pain with extension. She has pain with inversion and eversion. Minimal tenderness to palpation of medial aspect of the left ankle. A scar notes to the lateral aspect of the left ankle and this is where her complaints of pain are noted upon palpation.    Skin:     General: Skin is warm and dry.      Coloration: Skin is not jaundiced.      Findings: No bruising, erythema, lesion or rash.      Comments: No erythema noted. Minimal edema noted to lateral left ankle.   Neurological:      Mental Status: She is alert and oriented to person, place, and time.      Comments: Ambulatory; however, gait is guarded and without assistance.   Psychiatric:         Mood and Affect: Mood normal.         Behavior: Behavior normal.         Thought Content: Thought content normal.         Judgment: Judgment normal.        Result Review :       Data reviewed: Radiologic studies xrays of L ankle reviewed today           Assessment and Plan    Diagnoses and all orders for this visit:    1. Acute left ankle pain (Primary)  -     ibuprofen (ADVIL,MOTRIN) 800 MG tablet; Take 1 tablet by mouth Every 6 (Six) Hours As Needed for Mild Pain .  Dispense: 90 tablet; Refill: 0  -     XR Ankle 3+ View Left; Future    2. History of ankle surgery  -     ibuprofen (ADVIL,MOTRIN) 800 MG tablet; Take 1 tablet by mouth Every 6 (Six) Hours As Needed for Mild Pain .  Dispense: 90 tablet; Refill: 0  -     XR Ankle 3+ View  Left; Future      I will obtain x-ray and strongly consider referral back to Dr. Cuevas, podiatry, depending upon x-ray results. Will inform her of results via phone when they return, likely tomorrow. She is advised to utilize RICE therapy and will be prescribed ibuprofen 800 mg to be taken as needed up to 3 times daily for pain. If  symptoms should persist or worsen she will call back to let me know. She will otherwise follow-up as scheduled for chronic conditions. All questions and concerns were addressed with understanding verbalized. She is aware and in agreement to this plan.    I spent 33 minutes caring for Nilda on this date of service. This time includes time spent by me in the following activities:preparing for the visit, reviewing tests, obtaining and/or reviewing a separately obtained history, performing a medically appropriate examination and/or evaluation , counseling and educating the patient/family/caregiver, ordering medications, tests, or procedures and documenting information in the medical record  Follow Up   Return if symptoms worsen or fail to improve, for Recheck, or sooner as needed.  Patient was given instructions and counseling regarding her condition or for health maintenance advice. Please see specific information pulled into the AVS if appropriate.     Transcribed from ambient dictation for ABBI Vela by Aleida Tyler.  04/25/22   15:11 CDT    Patient verbalized consent to the visit recording.  I have personally performed the services described in this document as transcribed by the above individual, and it is both accurate and complete.  ABBI Vela  4/25/2022  15:23 CDT

## 2022-04-26 DIAGNOSIS — M25.472 EFFUSION OF LEFT ANKLE: ICD-10-CM

## 2022-04-26 DIAGNOSIS — M25.572 ACUTE LEFT ANKLE PAIN: Primary | ICD-10-CM

## 2022-05-02 ENCOUNTER — OFFICE VISIT (OUTPATIENT)
Dept: PODIATRY | Facility: CLINIC | Age: 42
End: 2022-05-02

## 2022-05-02 VITALS — BODY MASS INDEX: 32.14 KG/M2 | OXYGEN SATURATION: 98 % | HEART RATE: 94 BPM | HEIGHT: 66 IN | WEIGHT: 200 LBS

## 2022-05-02 DIAGNOSIS — M25.472 ANKLE EFFUSION, LEFT: Primary | ICD-10-CM

## 2022-05-02 DIAGNOSIS — M25.572 ACUTE LEFT ANKLE PAIN: ICD-10-CM

## 2022-05-02 PROCEDURE — 20600 DRAIN/INJ JOINT/BURSA W/O US: CPT | Performed by: PODIATRIST

## 2022-05-02 PROCEDURE — 99203 OFFICE O/P NEW LOW 30 MIN: CPT | Performed by: PODIATRIST

## 2022-05-02 RX ORDER — BETAMETHASONE SODIUM PHOSPHATE AND BETAMETHASONE ACETATE 3; 3 MG/ML; MG/ML
6 INJECTION, SUSPENSION INTRA-ARTICULAR; INTRALESIONAL; INTRAMUSCULAR; SOFT TISSUE ONCE
Status: COMPLETED | OUTPATIENT
Start: 2022-05-02 | End: 2022-05-02

## 2022-05-02 RX ADMIN — BETAMETHASONE SODIUM PHOSPHATE AND BETAMETHASONE ACETATE 6 MG: 3; 3 INJECTION, SUSPENSION INTRA-ARTICULAR; INTRALESIONAL; INTRAMUSCULAR; SOFT TISSUE at 16:53

## 2022-05-02 NOTE — PROGRESS NOTES
Nilda Jerez  1980  41 y.o. female     Patient came to clinic for concern of left ankle pain. Xray obtained on 2022.    2022    Chief Complaint   Patient presents with   • Left Ankle - Pain       History of Present Illness    Nilda Jerez is a 41 y.o. female who presents for evaluation of sharp left ankle pain over past 4 weeks.  Denies any recent trauma or injuries.  Does have prior history of left foot ganglion cyst excision in 2018.  States she works long hours on her feet.  Has been wearing a light ankle brace recently with minimal improvement.  Past Medical History:   Diagnosis Date   • Acute serous otitis media    • Acute sinusitis    • Asthma    • Chronic pain    • Degeneration of lumbar intervertebral disc    • Encounter for gynecological examination    • Encounter for long-term (current) use of medications     other   • Ganglion cyst     Right wrist      • H/O infectious mononucleosis    • Ingrown toenail    • Low back pain    • Lumbar radiculopathy     Left   • Menometrorrhagia    • Need for prophylactic vaccination or inoculation against diphtheria and tetanus     Need for prophylactic vaccination and inoculation against Diptheria-tetanus-pertussis, combined [DTP] [DTaP]   • Neuropathy    • Pain in lower limb    • Patient currently pregnant     G 1, P 1    • Plantar fasciitis    • PONV (postoperative nausea and vomiting)    • Routine history and physical examination of adult     Administrative   • Smoker    • Spinal stenosis of lumbar region    • Tenosynovitis     Tendinitis AND/OR tenosynovitis of the pelvic region      • Tibialis tendinitis    • Tobacco dependence syndrome    • Upper respiratory infection          Past Surgical History:   Procedure Laterality Date   • ANAL FISSURECTOMY      Removal of anal fissure (1)      •  SECTION     • EXCISION FOOT TUMOR Left 2018    Procedure: EXCISION LEFT ANKLE GANGLION      (DR. UGO SCHAEFER) [id number];   "Surgeon: Ismael Cuevas DPM;  Location: [facility];  Service: Podiatry   • EXCISION LESION Right 9/19/2018    Procedure: EXCISION LIPOMA RIGHT LOWER FLANK [id number];  Surgeon: Jose Guadalupe Trevino MD;  Location: [facility];  Service: General   • HYSTEROSCOPY  03/07/2012    Hysteroscope procedure (1)      • INJECTION OF MEDICATION  11/21/2013    Kenalog (2)      • PAP SMEAR  10/02/2014   • VAGINAL DELIVERY  2003         Family History   Problem Relation Age of Onset   • Breast cancer Mother    • Diabetes Mother    • Hypertension Mother    • Diabetes Father    • Hypertension Father    • Crohn's disease Father    • Crohn's disease Sister          Social History     Socioeconomic History   • Marital status:    Tobacco Use   • Smoking status: Current Some Day Smoker     Packs/day: 1.00     Years: 10.00     Pack years: 10.00     Types: Cigarettes   • Smokeless tobacco: Never Used   Vaping Use   • Vaping Use: Never used   Substance and Sexual Activity   • Alcohol use: Yes     Comment: Occasional   • Drug use: No   • Sexual activity: Defer     Partners: Male         Current Outpatient Medications   Medication Sig Dispense Refill   • albuterol sulfate  (90 Base) MCG/ACT inhaler Inhale 2 puffs Every 6 (Six) Hours As Needed for Wheezing or Shortness of Air. 18 g 0   • ibuprofen (ADVIL,MOTRIN) 800 MG tablet Take 1 tablet by mouth Every 6 (Six) Hours As Needed for Mild Pain . 90 tablet 0   • pantoprazole (PROTONIX) 40 MG EC tablet Take 1 tablet by mouth Daily. 30 tablet 2     No current facility-administered medications for this visit.     Facility-Administered Medications Ordered in Other Visits   Medication Dose Route Frequency Provider Last Rate Last Admin   • electrolyte-148 (PLASMALYTE) solution 1,000 mL  1,000 mL Intravenous Continuous Seth Phipps MD             OBJECTIVE    Pulse 94   Ht 167.6 cm (66\")   Wt 90.7 kg (200 lb)   SpO2 98%   BMI 32.28 kg/m²       Review of Systems "   Constitutional: Negative.    HENT: Negative.    Eyes: Negative.    Respiratory: Negative.    Cardiovascular: Negative.    Gastrointestinal: Negative.    Endocrine: Negative.    Genitourinary: Negative.    Musculoskeletal: Positive for back pain.        Joint pain   Skin: Negative.    Allergic/Immunologic: Negative.    Neurological: Negative.    Hematological: Negative.    Psychiatric/Behavioral: The patient is nervous/anxious.          Physical Exam   Constitutional: he appears well-developed and well-nourished.   HEENT: Normocephalic. Atraumatic.  CV: No CP. RRR  Resp: Non-labored respirations.  Psychiatric: he has a normal mood and affect. his behavior is normal.         Lower Extremity Exam:  Vascular: DP/PT pulses palpable 2+.   Minimal left ankle edema  Foot warm  Negative Epifanio  Neuro: Protective sensation intact, b/l.  Light touch sensation intact, b/l  DTRs intact  Integument: No open wounds or lesions.  No erythema, scaling  No masses  Left ankle incision well healed.   Musculoskeletal:  B/LE muscle strength 5/5.   Achilles, TA, TP, Peroneal tendons intact  Ankle ROM full without pain or crepitus  No gross instability  , Palpation over sinus tarsi, ATFL on left    Small joint injection:  Risks and benefits discussed. Written consent obtained.  Skin prepped with alcohol  Left Sinus tarsi injection performed with 1cc 0.5% Marcaine, 1 cc Celestone Soluspan with 27g needle  Band aid applied. Patient tolerated well.          ASSESSMENT AND PLAN    Diagnoses and all orders for this visit:    1. Ankle effusion, left (Primary)    2. Acute left ankle pain      -Radiographs reviewed  -Steroid injection as above.  Continue supportive bracing  -Will recheck 3 weeks, consider MRI if not improved.            This document has been electronically signed by Ismael Cuevas DPM on May 2, 2022 10:55 CDT     EMR Dragon/Transcription disclaimer:   Much of this encounter note is an electronic transcription/translation of  spoken language to printed text. The electronic translation of spoken language may permit erroneous, or at times, nonsensical words or phrases to be inadvertently transcribed; Although I have reviewed the note for such errors, some may still exist.    Ismael Cuevas DPM  5/2/2022  10:55 CDT

## 2022-05-05 ENCOUNTER — PATIENT ROUNDING (BHMG ONLY) (OUTPATIENT)
Dept: PODIATRY | Facility: CLINIC | Age: 42
End: 2022-05-05

## 2022-05-05 NOTE — PROGRESS NOTES
May 5, 2022    Hello, may I speak with Nilda Jerez?    My name is LANE RICHARDSON    I am  with [facility]  [address]   [place] [address] [phone].    Before we get started may I verify your date of birth? 1980    I am calling to officially welcome you to our practice and ask about your recent visit. Is this a good time to talk? yes    Tell me about your visit with us. What things went well?  EVERYTHING WENT WELL, THIS IS NOT THE FIRST TIME I SEEN HIM.  I SEEN HIM BACK IN 2018 AND WHEN I HAD A PROBLEM AGAIN I WANTED TO SEE HIM       We're always looking for ways to make our patients' experiences even better. Do you have recommendations on ways we may improve?  no    Overall were you satisfied with your first visit to our practice? yes       I appreciate you taking the time to speak with me today. Is there anything else I can do for you? no      Thank you, and have a great day.

## 2022-07-19 ENCOUNTER — OFFICE VISIT (OUTPATIENT)
Dept: PODIATRY | Facility: CLINIC | Age: 42
End: 2022-07-19

## 2022-07-19 VITALS — WEIGHT: 200 LBS | HEIGHT: 66 IN | HEART RATE: 96 BPM | OXYGEN SATURATION: 98 % | BODY MASS INDEX: 32.14 KG/M2

## 2022-07-19 DIAGNOSIS — M25.572 CHRONIC PAIN OF LEFT ANKLE: ICD-10-CM

## 2022-07-19 DIAGNOSIS — M25.472 ANKLE EFFUSION, LEFT: Primary | ICD-10-CM

## 2022-07-19 DIAGNOSIS — G89.29 CHRONIC PAIN OF LEFT ANKLE: ICD-10-CM

## 2022-07-19 PROCEDURE — 99213 OFFICE O/P EST LOW 20 MIN: CPT | Performed by: PODIATRIST

## 2022-07-19 NOTE — PROGRESS NOTES
Nilda Jerez  1980  41 y.o. female     Patient presents to clinic today for a recheck on the left ankle.    2022      Chief Complaint   Patient presents with   • Left Ankle - Follow-up, Pain       History of Present Illness    Nilda Jerez is a 41 y.o. female who presents for f/u evaluation of sharp left ankle pain over past 3 months.  Denies any recent trauma or injuries.  Does have prior history of left foot ganglion cyst excision in 2018.  Has been bracing and performed a corticosteroid injection at last visit with minimal relief.  Continues to note intermittent significant swelling.    Past Medical History:   Diagnosis Date   • Acute serous otitis media    • Acute sinusitis    • Asthma    • Chronic pain    • Degeneration of lumbar intervertebral disc    • Encounter for gynecological examination    • Encounter for long-term (current) use of medications     other   • Ganglion cyst     Right wrist      • H/O infectious mononucleosis    • Ingrown toenail    • Low back pain    • Lumbar radiculopathy     Left   • Menometrorrhagia    • Need for prophylactic vaccination or inoculation against diphtheria and tetanus     Need for prophylactic vaccination and inoculation against Diptheria-tetanus-pertussis, combined [DTP] [DTaP]   • Neuropathy    • Pain in lower limb    • Patient currently pregnant     G 1, P 1    • Plantar fasciitis    • PONV (postoperative nausea and vomiting)    • Routine history and physical examination of adult     Administrative   • Smoker    • Spinal stenosis of lumbar region    • Tenosynovitis     Tendinitis AND/OR tenosynovitis of the pelvic region      • Tibialis tendinitis    • Tobacco dependence syndrome    • Upper respiratory infection          Past Surgical History:   Procedure Laterality Date   • ANAL FISSURECTOMY      Removal of anal fissure (1)      •  SECTION     • EXCISION FOOT TUMOR Left 2018    Procedure: EXCISION LEFT ANKLE GANGLION       "(DR. ARIZMENDI FIRST) [id number];  Surgeon: Ismael Arizmendi DPM;  Location: [facility];  Service: Podiatry   • EXCISION LESION Right 9/19/2018    Procedure: EXCISION LIPOMA RIGHT LOWER FLANK [id number];  Surgeon: Jose Guadalupe Trevino MD;  Location: [facility];  Service: General   • HYSTEROSCOPY  03/07/2012    Hysteroscope procedure (1)      • INJECTION OF MEDICATION  11/21/2013    Kenalog (2)      • PAP SMEAR  10/02/2014   • VAGINAL DELIVERY  2003         Family History   Problem Relation Age of Onset   • Breast cancer Mother    • Diabetes Mother    • Hypertension Mother    • Diabetes Father    • Hypertension Father    • Crohn's disease Father    • Crohn's disease Sister          Social History     Socioeconomic History   • Marital status:    Tobacco Use   • Smoking status: Current Some Day Smoker     Packs/day: 1.00     Years: 10.00     Pack years: 10.00     Types: Cigarettes   • Smokeless tobacco: Never Used   Vaping Use   • Vaping Use: Never used   Substance and Sexual Activity   • Alcohol use: Yes     Comment: Occasional   • Drug use: No   • Sexual activity: Defer     Partners: Male         Current Outpatient Medications   Medication Sig Dispense Refill   • albuterol sulfate  (90 Base) MCG/ACT inhaler Inhale 2 puffs Every 6 (Six) Hours As Needed for Wheezing or Shortness of Air. 18 g 0     No current facility-administered medications for this visit.     Facility-Administered Medications Ordered in Other Visits   Medication Dose Route Frequency Provider Last Rate Last Admin   • electrolyte-148 (PLASMALYTE) solution 1,000 mL  1,000 mL Intravenous Continuous Seth Phipps MD             OBJECTIVE    Pulse 96   Ht 167.6 cm (66\")   Wt 90.7 kg (200 lb)   SpO2 98%   BMI 32.28 kg/m²       Review of Systems   Constitutional: Negative.    HENT: Negative.    Eyes: Negative.    Respiratory: Negative.    Cardiovascular: Negative.    Gastrointestinal: Negative.    Endocrine: Negative.    Genitourinary: " Negative.    Musculoskeletal: Positive for back pain.        Joint pain   Skin: Negative.    Allergic/Immunologic: Negative.    Neurological: Negative.    Hematological: Negative.    Psychiatric/Behavioral: The patient is nervous/anxious.          Physical Exam   Constitutional: he appears well-developed and well-nourished.   HEENT: Normocephalic. Atraumatic.  CV: No CP. RRR  Resp: Non-labored respirations.  Psychiatric: he has a normal mood and affect. his behavior is normal.         Lower Extremity Exam:  Vascular: DP/PT pulses palpable 2+.   Mild left lateral ankle edema  Foot warm  Negative Epifanio  Neuro: Protective sensation intact, b/l.  Light touch sensation intact, b/l  DTRs intact  Integument: No open wounds or lesions.  No erythema, scaling  No masses  Left ankle incision well healed.   Musculoskeletal:  B/LE muscle strength 5/5.   Achilles, TA, TP, Peroneal tendons intact  Ankle ROM full without pain or crepitus  No gross instability  Tenderness to palpation over sinus tarsi, ATFL on left          ASSESSMENT AND PLAN    Diagnoses and all orders for this visit:    1. Ankle effusion, left (Primary)  -     MRI Ankle Left Without Contrast; Future    2. Chronic pain of left ankle  -     MRI Ankle Left Without Contrast; Future      -Comprehensive foot and ankle exam.  -Given prior surgical history and duration of symptoms recommend further evaluation with MRI.  -Continue bracing, NSAIDs  -Recheck following MRI            This document has been electronically signed by Ismael Cuevas DPM on July 19, 2022 08:43 CDT     EMR Dragon/Transcription disclaimer:   Much of this encounter note is an electronic transcription/translation of spoken language to printed text. The electronic translation of spoken language may permit erroneous, or at times, nonsensical words or phrases to be inadvertently transcribed; Although I have reviewed the note for such errors, some may still exist.    Ismael Cuevas,  INDIRA  7/19/2022  08:43 CDT

## 2022-07-23 ENCOUNTER — HOSPITAL ENCOUNTER (OUTPATIENT)
Dept: MRI IMAGING | Facility: HOSPITAL | Age: 42
Discharge: HOME | End: 2022-07-23
Admitting: PODIATRIST

## 2022-07-23 DIAGNOSIS — M25.572 CHRONIC PAIN OF LEFT ANKLE: ICD-10-CM

## 2022-07-23 DIAGNOSIS — M25.472 ANKLE EFFUSION, LEFT: ICD-10-CM

## 2022-07-23 DIAGNOSIS — G89.29 CHRONIC PAIN OF LEFT ANKLE: ICD-10-CM

## 2022-07-23 PROCEDURE — 73721 MRI JNT OF LWR EXTRE W/O DYE: CPT

## 2023-08-23 ENCOUNTER — OFFICE VISIT (OUTPATIENT)
Dept: OTOLARYNGOLOGY | Facility: CLINIC | Age: 43
End: 2023-08-23
Payer: COMMERCIAL

## 2023-08-23 VITALS — HEIGHT: 66 IN | BODY MASS INDEX: 27.32 KG/M2 | OXYGEN SATURATION: 99 % | HEART RATE: 101 BPM | WEIGHT: 170 LBS

## 2023-08-23 DIAGNOSIS — K04.7 PERIAPICAL ABSCESS WITH FACIAL INVOLVEMENT: Primary | ICD-10-CM

## 2023-08-23 NOTE — PROGRESS NOTES
"Chief complaint: Facial swelling    Assessment and Plan:  43-year-old female with likely intermittent odontogenic cellulitis, evidence of possible periapical abscess today    -We discussed that it clinically sounds like she is developing cellulitis versus sinusitis with cellulitis from an odontogenic source, currently there is no active infection but I would recommend she seek the care of a dentist to evaluate the gingival lesion/possible periapical abscess.  We did discuss that sinusitis from an odontogenic source requires control of the odontogenic source prior to intervention.  This may resolve once the tooth has been addressed.  -Follow-up if you develop facial swelling again  -We discussed that if she does develop this problem again I will obtain a CT scan to evaluate for small fistula    History of present illness:    Ms. Jerez is a 43-year-old female presented today for evaluation of recurrent facial swelling.  She states this began 5 years ago after a dental procedure and she developed a secondary infection causing facial swelling over the left maxilla after a needle \"went into the sinus\".  Since that time this has occurred intermittently a few times per year and resolved with antibiotics, she denies purulent drainage into the mouth during these episodes but notes that there is an area on her left gum overlying the canine that does swell and become tender during these acute fractions.  She states the last time this occurred was about a month ago.  She does not currently have a dentist.    Vital Signs   Vitals:    08/23/23 1504   Pulse: 101   SpO2: 99%     Physical Exam:  General: NAD, awake and alert  Head: normocephalic, atraumatic, no current facial swelling  Eyes: EOMI, sclerae white, conjunctivae pink  Ears: pinnae intact without masses or lesions   Right: TM intact without injection or effusion   Left: TM intact without injection or effusion  Nose: external straight without deformity   Mucosa " pink, not edematous. No polyps seen. No purulence.  Dry and irritated with dried secretions present bilaterally   Septum: midline   Turbinates: mildly hypertrophied  OC/OP: mucosa moist and pink, no masses, there is erosion of the gingiva overlying tooth 9 without purulence, tongue is midline and mobile. Tonsils 2+ without exudate. Uvula single and elevates symmetrically.  Neck: supple, no masses or lesions. Thyroid without palpable masses.  Neuro: CN II - XII grossly intact, no focal deficits    Results Review:  Referring note was not included with the referral, the patient's summary demonstrates diagnoses of acute sinusitis and otitis media of the left ear recommendations were for a Medrol Dosepak.    Review of Systems:  Positive ROS items: none Noted  Otherwise, a 14 system ROS is negative except as pertinent positives are mentioned above.    Histories:  No Known Allergies    Prior to Admission medications    Medication Sig Start Date End Date Taking? Authorizing Provider   albuterol sulfate  (90 Base) MCG/ACT inhaler Inhale 2 puffs Every 6 (Six) Hours As Needed for Wheezing or Shortness of Air. 3/23/22   Nellie Maurice, ABBI       Past Medical History:   Diagnosis Date    Acute serous otitis media     Acute sinusitis     Asthma     Chronic pain     Degeneration of lumbar intervertebral disc     Encounter for gynecological examination     Encounter for long-term (current) use of medications     other    Ganglion cyst     Right wrist       H/O infectious mononucleosis     Ingrown toenail     Low back pain     Lumbar radiculopathy     Left    Menometrorrhagia     Need for prophylactic vaccination or inoculation against diphtheria and tetanus     Need for prophylactic vaccination and inoculation against Diptheria-tetanus-pertussis, combined [DTP] [DTaP]    Neuropathy     Pain in lower limb     Patient currently pregnant     G 1, P 1     Plantar fasciitis     PONV (postoperative nausea and vomiting)      Routine history and physical examination of adult     Administrative    Smoker     Spinal stenosis of lumbar region     Tenosynovitis     Tendinitis AND/OR tenosynovitis of the pelvic region       Tibialis tendinitis     Tobacco dependence syndrome     Upper respiratory infection        Past Surgical History:   Procedure Laterality Date    ANAL FISSURECTOMY      Removal of anal fissure (1)        SECTION      EXCISION FOOT TUMOR Left 2018    Procedure: EXCISION LEFT ANKLE GANGLION      (DR. UGO SCHAEFER) [id number];  Surgeon: Ismael Cuevas DPM;  Location: [facility];  Service: Podiatry    EXCISION LESION Right 2018    Procedure: EXCISION LIPOMA RIGHT LOWER FLANK [id number];  Surgeon: Jose Guadalupe Trevino MD;  Location: [facility];  Service: General    HYSTEROSCOPY  2012    Hysteroscope procedure (1)       INJECTION OF MEDICATION  2013    Kenalog (2)       PAP SMEAR  10/02/2014    VAGINAL DELIVERY         Social History     Socioeconomic History    Marital status:    Tobacco Use    Smoking status: Some Days     Packs/day: 1.00     Years: 10.00     Pack years: 10.00     Types: Cigarettes    Smokeless tobacco: Never   Vaping Use    Vaping Use: Never used   Substance and Sexual Activity    Alcohol use: Yes     Comment: Occasional    Drug use: No    Sexual activity: Defer     Partners: Male       Family History   Problem Relation Age of Onset    Breast cancer Mother     Diabetes Mother     Hypertension Mother     Diabetes Father     Hypertension Father     Crohn's disease Father     Crohn's disease Sister        Immunization status not specifically asked and therefore not specifically documented.    Voice dictation disclaimer:  Voice dictation was used in the creation of this note.  As such, there may be typos or inappropriate words throughout the document.  The document is proofread for typos and errors, however some may not be caught.      This document has been  electronically signed by Savi Lieberman MD on August 23, 2023 15:07 CDT

## 2023-08-23 NOTE — LETTER
August 23, 2023     Patient: Nilda Jerez   YOB: 1980   Date of Visit: 8/23/2023       To Whom It May Concern:    It is my medical opinion that Nilda Jerez may return to work on 8/24/2023 .           Sincerely,          This document has been electronically signed by Savi Lieberman MD on August 23, 2023 15:23 CDT           Savi Lieberman MD    CC:   No Recipients

## (undated) DEVICE — UNDRPD BREATH 23X36 BG/10

## (undated) DEVICE — BANDAGE,GAUZE,BULKEE II,4.5"X4.1YD,STRL: Brand: MEDLINE

## (undated) DEVICE — PENCL E/S HNDSWCH ROCKR CB

## (undated) DEVICE — SPNG LAP 18X18IN LF STRL PK/5

## (undated) DEVICE — STERILE POLYISOPRENE POWDER-FREE SURGICAL GLOVES WITH EMOLLIENT COATING: Brand: PROTEXIS

## (undated) DEVICE — GLV SURG TRIUMPH LT PF LTX 6.5 STRL

## (undated) DEVICE — PK MAJ PROC LF 60

## (undated) DEVICE — DRSNG GZ CURAD XEROFORM NONADHS 5X9IN STRL

## (undated) DEVICE — SKIN AFFIX SURG ADHESIVE 72/CS 0.55ML: Brand: MEDLINE

## (undated) DEVICE — SUT VIC 3/0 SH 27IN J416H

## (undated) DEVICE — BNDG ELAS ELITE V/CLOSE 6IN 5YD LF STRL

## (undated) DEVICE — SPNG GZ WOVN 4X4IN 12PLY 10/BX STRL

## (undated) DEVICE — TP SXN YANKR BLB TIP W/TBG 10F LF STRL

## (undated) DEVICE — GLV SURG SENSICARE GREEN W/ALOE PF LF 7 STRL

## (undated) DEVICE — ANTIBACTERIAL UNDYED BRAIDED (POLYGLACTIN 910), SYNTHETIC ABSORBABLE SUTURE: Brand: COATED VICRYL

## (undated) DEVICE — SUT ETHLN 4/0 FS2 18IN 662G

## (undated) DEVICE — PK POD 60

## (undated) DEVICE — STERILE POLYISOPRENE POWDER-FREE SURGICAL GLOVES: Brand: PROTEXIS

## (undated) DEVICE — DISPOSABLE TOURNIQUET CUFF SINGLE BLADDER, DUAL PORT AND QUICK CONNECT CONNECTOR: Brand: COLOR CUFF

## (undated) DEVICE — GLV SURG SENSICARE GREEN W/ALOE PF LF 8 STRL

## (undated) DEVICE — BNDG ELAS ELITE V/CLOSE 4IN 5YD LF STRL

## (undated) DEVICE — APPL CHLORAPREP W/TINT 26ML ORNG

## (undated) DEVICE — CONTAINER,SPECIMEN,OR STERILE,4OZ: Brand: MEDLINE

## (undated) DEVICE — DRAPE,U/ SHT,SPLIT,PLAS,STERIL: Brand: MEDLINE

## (undated) DEVICE — GLV SURG TRIUMPH LT PF LTX 7.5 STRL

## (undated) DEVICE — SOL IRR NACL 0.9PCT BT 1000ML